# Patient Record
Sex: MALE | Race: WHITE
[De-identification: names, ages, dates, MRNs, and addresses within clinical notes are randomized per-mention and may not be internally consistent; named-entity substitution may affect disease eponyms.]

---

## 2020-07-15 ENCOUNTER — HOSPITAL ENCOUNTER (INPATIENT)
Dept: HOSPITAL 74 - YASAS | Age: 40
LOS: 6 days | Discharge: OTHER INSTITUTION | DRG: 773 | End: 2020-07-21
Attending: ALLERGY & IMMUNOLOGY | Admitting: ALLERGY & IMMUNOLOGY
Payer: COMMERCIAL

## 2020-07-15 VITALS — BODY MASS INDEX: 27.8 KG/M2

## 2020-07-15 DIAGNOSIS — F11.20: ICD-10-CM

## 2020-07-15 DIAGNOSIS — F13.230: ICD-10-CM

## 2020-07-15 DIAGNOSIS — Z79.2: ICD-10-CM

## 2020-07-15 DIAGNOSIS — Z87.2: ICD-10-CM

## 2020-07-15 DIAGNOSIS — F19.24: ICD-10-CM

## 2020-07-15 DIAGNOSIS — M25.569: ICD-10-CM

## 2020-07-15 DIAGNOSIS — F14.20: ICD-10-CM

## 2020-07-15 DIAGNOSIS — B18.2: ICD-10-CM

## 2020-07-15 DIAGNOSIS — F10.230: Primary | ICD-10-CM

## 2020-07-15 DIAGNOSIS — N30.20: ICD-10-CM

## 2020-07-15 DIAGNOSIS — M54.89: ICD-10-CM

## 2020-07-15 DIAGNOSIS — F17.210: ICD-10-CM

## 2020-07-15 DIAGNOSIS — Z90.5: ICD-10-CM

## 2020-07-15 PROCEDURE — U0003 INFECTIOUS AGENT DETECTION BY NUCLEIC ACID (DNA OR RNA); SEVERE ACUTE RESPIRATORY SYNDROME CORONAVIRUS 2 (SARS-COV-2) (CORONAVIRUS DISEASE [COVID-19]), AMPLIFIED PROBE TECHNIQUE, MAKING USE OF HIGH THROUGHPUT TECHNOLOGIES AS DESCRIBED BY CMS-2020-01-R: HCPCS

## 2020-07-15 PROCEDURE — HZ2ZZZZ DETOXIFICATION SERVICES FOR SUBSTANCE ABUSE TREATMENT: ICD-10-PCS | Performed by: ALLERGY & IMMUNOLOGY

## 2020-07-15 RX ADMIN — CLINDAMYCIN HYDROCHLORIDE SCH MG: 150 CAPSULE ORAL at 20:38

## 2020-07-15 RX ADMIN — Medication SCH APPLIC: at 21:44

## 2020-07-15 RX ADMIN — Medication SCH MG: at 22:12

## 2020-07-15 RX ADMIN — CLINDAMYCIN HYDROCHLORIDE SCH MG: 150 CAPSULE ORAL at 23:00

## 2020-07-15 NOTE — HP
CIWA Score


Nausea/Vomitin-Mild Nausea/No Vomiting


Muscle Tremors: 4-Moderate,w/Arms Extend


Anxiety: 4-Mod. Anxious/Guarded


Agitation: 2


Paroxysmal Sweats: 1-Minimal Palms Moist


Orientation: 0-Oriented


Tacttile Disturbances: 0-None


Auditory Disturbances: 0-None


Visual Disturbances: 0-None


Headache: 0-None Present


CIWA-Ar Total Score: 12





- Admission Criteria


[facility] Guidelines: Admission for Medically Managed Detox: 


Requires at least one of the followin. CIWA greater than 12


2. Seizures within the past 24 hours


3. Delirium tremens within the past 24 hours


4. Hallucinations within the past 24 hours


5. Acute intervention needed for co  occurring medical disorder


6. Acute intervention needed for co  occurring psychiatric disorder


7. Severe withdrawal that cannot be handled at a lower level of care (continued


    vomiting, continued diarrhea, abnormal vital signs) requiring intravenous


    medication and/or fluids


8. Pregnancy








Admission ROS [facility]





- [facility]


Allergies/Adverse Reactions: 


                                    Allergies











Allergy/AdvReac Type Severity Reaction Status Date / Time


 


No Known Allergies Allergy   Verified 07/15/20 19:23











History of Present Illness: 








Search Terms: lorraine dickerson, 1980


Search Date: 07/15/2020 18:36:24 PM





The Drug Utilization Report below displays all of the controlled substance 

prescriptions, if any, that your patient has filled in the last twelve months. 

The information displayed on this report is compiled from pharmacy submissions 

to the Department, and accurately reflects the information as submitted by the 

pharmacies.





This report was requested by: Nasima Gan | Reference #: [id number]





There are no results for the search terms that you entered.





39  y.o. male  requesting detox from  benzo  and alcohol  use  . 


reports alcohol  use  since  age 14  , current  daily  use 8  beers/day since 

2020  , reports  tremors  if  not drinking  ,denies  blackouts  and  

seizures . Prior to January  he was  drinking  1  pint  liquor  /day  since  5  

years  ago  , prior  to  which  he was  drinking  socially  .  latest  use  was 

yesterday . 


cocaine  - 1  -2  gr  /day  via  IV  in   [place]  ,  abscess  now  and  none  prior 

, denies  endocarditis  or seizures  .  First age of use  14 , IV  since age  22




heroin  since age  21  ,   OD x 2  most recently    , narcan  by EMS  ,  has

Narcan at  home  and  knows  how  to  use  it  . Longest  sobriety  [id number]  

while  incarcerated  for drug-related  charges  .  In  [facility]  intermittently  , 

most recently @ [facility]  , current  daily  dose  40  mg  due  to  many  missed . 


cannabis  - age  11  , currently  not  daily use .


tried  PCP  , MDMA  , crystal   metamphetamines  ,  psilocybine , crack  cocaine

 


benzo  -  since age  21  ,   had  rx  for a  while  ( 2  yrs )  , stopped   

,  restarted illicit  use  since  2019  ,  xanax  2  sticks /day each  2  

mg   up  to 5  /day  ,  reports  w/d seizure  most  recently   , never  on  

anti-epileptics   .  latest use  was yesterday .  


Pt  reports  he  had  right  arm swelling @ [facility]  site  , went o [facility]  , given rx for Clindamycin  after  one-time  IV dose  of Clindamycin .








PMHX  : Hep  C  dx  10 years ago  no tx  , states  he is  HIV negative  , denies

tx for STDs 


PSX : [place]  neph[place]  [place]    [place]/  rib frx  /  punctured  

kidney 


PSYCH :  denies  .  


SHX :  lives w/  mother  and  oldest son  age  23 .  Denies current  legal 

issues  . Unemployed  . 


Exam Limitations: No Limitations





- Review of Systems


Constitutional: Loss of Appetite


EENT: reports: Other (myopia)


Respiratory: reports: No Symptoms reported


Cardiac: reports: No Symptoms Reported


GI: reports: Poor Appetite


: reports: No Symptoms Reported


Musculoskeletal: reports: Joint Pain (knee  pain  , back  pain  -  bodyaches)


Integumentary: reports: See HPI, Other (r  arm abscess)


Neuro: reports: Tremors


Endocrine: reports: No Symptoms Reported


Hematology: reports: No Symptoms Reported


Psychiatric: reports: Orientated x3, Agitated, Anxious





Patient History





- Smoking Cessation


Smoking history: Current every day smoker


Have you smoked in the past 12 months: Yes


Hx Chewing Tobacco Use: No


Initiated information on smoking cessation: Yes


'Breaking Loose' booklet given: 07/15/20





- Substances abused


  ** Alcohol


Substance route: Oral


Frequency: Daily


Amount used: beer 8 to 10 cans.


Age of first use: 14


Date of last use: 20





  ** Cocaine


Substance route: Inhalation


Frequency: 1-2 times per week


Amount used: 1 to 2 grams


Age of first use: 14


Date of last use: 17





Admission Physical Exam [facility]





- Physical


General Appearance: Yes: Mild Distress, Anxious


HEENTM: Yes: EOMI, Hearing grossly Normal, Normocephalic, Normal Voice


Respiratory: Yes: Chest Non-Tender, Lungs Clear, Normal Breath Sounds, No 

Respiratory Distress, No Accessory Muscle Use


Neck: Yes: No masses,lesions,Nodules, Trachea in good position


Cardiology: Yes: Regular Rhythm, Regular Rate, S1, S2


Abdominal: Yes: Non Tender, Soft


Back: Yes: Normal Inspection


Musculoskeletal: Yes: Gait Steady


Extremities: Yes: Normal Inspection, Normal Range of Motion, Non-Tender, 

Tremors, Swelling (R  arm  antecubital)


Neurological: Yes: Fully Oriented, Alert, Motor Strength 5/5, Normal Mood/Affect


Integumentary: Yes: Warm, Track Marks (eleazar UE)





- Diagnostic


(1) Opioid dependence on agonist therapy


Current Visit: Yes   Status: Chronic   





(2) Sedative, hypnotic or anxiolytic abuse


Current Visit: Yes   Status: Chronic   





(3) Alcohol use disorder


Current Visit: Yes   Status: Chronic   





Inpatient Rehab Admission





- Rehab Decision to Admit


Inpatient rehab admission?: No

## 2020-07-16 LAB
ALBUMIN SERPL-MCNC: 3.4 G/DL (ref 3.4–5)
ALP SERPL-CCNC: 60 U/L (ref 45–117)
ALT SERPL-CCNC: 40 U/L (ref 13–61)
ANION GAP SERPL CALC-SCNC: 4 MMOL/L (ref 8–16)
AST SERPL-CCNC: 29 U/L (ref 15–37)
BILIRUB SERPL-MCNC: 0.6 MG/DL (ref 0.2–1)
BUN SERPL-MCNC: 12.9 MG/DL (ref 7–18)
CALCIUM SERPL-MCNC: 9.3 MG/DL (ref 8.5–10.1)
CHLORIDE SERPL-SCNC: 104 MMOL/L (ref 98–107)
CO2 SERPL-SCNC: 30 MMOL/L (ref 21–32)
CREAT SERPL-MCNC: 1.1 MG/DL (ref 0.55–1.3)
DEPRECATED RDW RBC AUTO: 12.7 % (ref 11.9–15.9)
GLUCOSE SERPL-MCNC: 90 MG/DL (ref 74–106)
HCT VFR BLD CALC: 38.4 % (ref 35.4–49)
HGB BLD-MCNC: 12.9 GM/DL (ref 11.7–16.9)
MCH RBC QN AUTO: 29.8 PG (ref 25.7–33.7)
MCHC RBC AUTO-ENTMCNC: 33.5 G/DL (ref 32–35.9)
MCV RBC: 88.8 FL (ref 80–96)
PLATELET # BLD AUTO: 200 K/MM3 (ref 134–434)
PMV BLD: 8.8 FL (ref 7.5–11.1)
POTASSIUM SERPLBLD-SCNC: 4.2 MMOL/L (ref 3.5–5.1)
PROT SERPL-MCNC: 8.2 G/DL (ref 6.4–8.2)
RBC # BLD AUTO: 4.32 M/MM3 (ref 4–5.6)
SODIUM SERPL-SCNC: 139 MMOL/L (ref 136–145)
WBC # BLD AUTO: 7 K/MM3 (ref 4–10)

## 2020-07-16 RX ADMIN — NICOTINE POLACRILEX PRN MG: 2 GUM, CHEWING ORAL at 14:44

## 2020-07-16 RX ADMIN — CLINDAMYCIN HYDROCHLORIDE SCH MG: 150 CAPSULE ORAL at 17:40

## 2020-07-16 RX ADMIN — CLINDAMYCIN HYDROCHLORIDE SCH MG: 150 CAPSULE ORAL at 23:07

## 2020-07-16 RX ADMIN — Medication SCH MG: at 22:06

## 2020-07-16 RX ADMIN — CLINDAMYCIN HYDROCHLORIDE SCH MG: 150 CAPSULE ORAL at 08:06

## 2020-07-16 RX ADMIN — Medication SCH APPLIC: at 22:13

## 2020-07-16 RX ADMIN — NICOTINE SCH: 7 PATCH TRANSDERMAL at 09:33

## 2020-07-16 RX ADMIN — CLINDAMYCIN HYDROCHLORIDE SCH MG: 150 CAPSULE ORAL at 13:35

## 2020-07-16 RX ADMIN — Medication SCH APPLIC: at 12:29

## 2020-07-16 RX ADMIN — Medication SCH: at 09:33

## 2020-07-16 NOTE — EKG
Test Reason : 

Blood Pressure : ***/*** mmHG

Vent. Rate : 062 BPM     Atrial Rate : 062 BPM

   P-R Int : 136 ms          QRS Dur : 086 ms

    QT Int : 428 ms       P-R-T Axes : 045 014 028 degrees

   QTc Int : 434 ms

 

NORMAL SINUS RHYTHM WITH SINUS ARRHYTHMIA

NORMAL ECG

NO PREVIOUS ECGS AVAILABLE

Confirmed by KEANU SHABAZZ MD ([id number]) on 7/16/2020 11:59:44 AM

 

Referred By:             Confirmed By:KEANU SHABAZZ MD

## 2020-07-16 NOTE — PN
[facility] CIWA





- CIWA Score


Nausea/Vomitin-Mild Nausea/No Vomiting


Muscle Tremors: 3


Anxiety: 3


Agitation: 3


Paroxysmal Sweats: 1-Minimal Palms Moist


Orientation: 0-Oriented


Tacttile Disturbances: 0-None


Auditory Disturbances: 0-None


Visual Disturbances: 0-None


Headache: 0-None Present


CIWA-Ar Total Score: 11





[facility] Progress Note (SOAP)


Subjective: 





39 years old male admitted on 07/15/20 for alcohol and benzo withdrawal sx 

management


treating with librium detox regiment


taking methadone 40mg po daily last dose 20


cows = 8


methadone 40mg resumed


Objective: 





20 12:54


                               Vital Signs - 24 hr











  07/15/20 07/15/20 07/16/20





  19:34 20:32 06:18


 


Temperature 97.8 F 97.7 F 97.1 F L


 


Pulse Rate 68 72 54 L


 


Respiratory 18 18 18





Rate   


 


Blood Pressure 133/89 128/82 101/58 L


 


O2 Sat by Pulse  100 100





Oximetry (%)   














  20





  09:03


 


Temperature 97.1 F L


 


Pulse Rate 67


 


Respiratory 18





Rate 


 


Blood Pressure 114/75


 


O2 Sat by Pulse 





Oximetry (%) 








                                Laboratory Tests











  20





  07:50 07:50 07:50


 


WBC   7.0 


 


RBC   4.32 


 


Hgb   12.9 


 


Hct   38.4 


 


MCV   88.8 


 


MCH   29.8 


 


MCHC   33.5 


 


RDW   12.7 


 


Plt Count   200 


 


MPV   8.8 


 


Sodium    139


 


Potassium    4.2


 


Chloride    104


 


Carbon Dioxide    30


 


Anion Gap    4 L


 


BUN    12.9


 


Creatinine    1.1


 


Est GFR (CKD-EPI)AfAm    96.13


 


Est GFR (CKD-EPI)NonAf    82.94


 


Random Glucose    90


 


Calcium    9.3


 


Total Bilirubin    0.6


 


AST    29


 


ALT    40


 


Alkaline Phosphatase    60


 


Total Protein    8.2


 


Albumin    3.4


 


Syphilis Serology  Non-reactive  








lab noted


20 12:55


covid pending


Assessment: 





20 12:55


alcohol and benzo withdrawal 


Plan: 





librium regiment





COWS (PN)





- Opiate Withdrawal


Resting Pulse: 0= ME 80 or Below


Sweatin= No chills or Flushing


Restless Observation: 0= Sits Still


Pupil Size: 1= Pupils >than Normal


Bone or Joint Aches: 1= Mild Discomfort


Runny Nose/ Eye Tearin= None


GI Upset > 30mins: 0= None


Tremor Observation of Outstretched Hands: 2= Slight Tremor Visible


Yawning Observation: 0= None


Anxiety or Irritability: 1=Feels Anxious/Irritable


Goose Flesh Skin: 3=Piloerection


COWS Score: 8

## 2020-07-17 RX ADMIN — METHADONE HYDROCHLORIDE SCH MG: 40 TABLET ORAL at 05:29

## 2020-07-17 RX ADMIN — CLINDAMYCIN HYDROCHLORIDE SCH MG: 150 CAPSULE ORAL at 17:24

## 2020-07-17 RX ADMIN — NICOTINE SCH MG: 7 PATCH TRANSDERMAL at 10:04

## 2020-07-17 RX ADMIN — CLINDAMYCIN HYDROCHLORIDE SCH MG: 150 CAPSULE ORAL at 23:04

## 2020-07-17 RX ADMIN — Medication SCH: at 22:06

## 2020-07-17 RX ADMIN — Medication SCH MG: at 22:04

## 2020-07-17 RX ADMIN — Medication SCH: at 10:04

## 2020-07-17 RX ADMIN — Medication SCH APPLIC: at 10:04

## 2020-07-17 RX ADMIN — CLINDAMYCIN HYDROCHLORIDE SCH MG: 150 CAPSULE ORAL at 05:30

## 2020-07-17 RX ADMIN — CLINDAMYCIN HYDROCHLORIDE SCH MG: 150 CAPSULE ORAL at 13:14

## 2020-07-17 RX ADMIN — Medication SCH: at 22:04

## 2020-07-17 NOTE — PN
S CIWA





- CIWA Score


Nausea/Vomitin-No Nausea/No Vomiting


Muscle Tremors: None


Anxiety: 1-Mildly Anxious


Agitation: 0-Normal Activity


Paroxysmal Sweats: 1-Minimal Palms Moist


Orientation: 0-Oriented


Tacttile Disturbances: 0-None


Auditory Disturbances: 0-None


Visual Disturbances: 0-None


Headache: 0-None Present


CIWA-Ar Total Score: 2





[facility] Progress Note (SOAP)


Subjective: 





40 y/o admitted on 07/15/20 for alcohol and benzo withdrawal sx management


treating with librium detox regimen


taking methadone 40mg po daily last dose 20


methadone 40mg resumed


Objective: 





20 12:31





PE


AAOx3, NAD


AT/NC


RRR, No MRG S1S2


CTAB


NDNT


No CCE


                             Laboratory Last Values











WBC  7.0 K/mm3 (4.0-10.0)   20  07:50    


 


RBC  4.32 M/mm3 (4.00-5.60)   20  07:50    


 


Hgb  12.9 GM/dL (11.7-16.9)   20  07:50    


 


Hct  38.4 % (35.4-49)   20  07:50    


 


MCV  88.8 fl (80-96)   20  07:50    


 


MCH  29.8 pg (25.7-33.7)   20  07:50    


 


MCHC  33.5 g/dl (32.0-35.9)   20  07:50    


 


RDW  12.7 % (11.9-15.9)   20  07:50    


 


Plt Count  200 K/MM3 (134-434)   20  07:50    


 


MPV  8.8 fl (7.5-11.1)   20  07:50    


 


Sodium  139 mmol/L (136-145)   20  07:50    


 


Potassium  4.2 mmol/L (3.5-5.1)   20  07:50    


 


Chloride  104 mmol/L ()   20  07:50    


 


Carbon Dioxide  30 mmol/L (21-32)   20  07:50    


 


Anion Gap  4 MMOL/L (8-16)  L  20  07:50    


 


BUN  12.9 mg/dL (7-18)   20  07:50    


 


Creatinine  1.1 mg/dL (0.55-1.3)   20  07:50    


 


Est GFR (CKD-EPI)AfAm  96.13   20  07:50    


 


Est GFR (CKD-EPI)NonAf  82.94   20  07:50    


 


Random Glucose  90 mg/dL ()   20  07:50    


 


Calcium  9.3 mg/dL (8.5-10.1)   20  07:50    


 


Total Bilirubin  0.6 mg/dL (0.2-1)   20  07:50    


 


AST  29 U/L (15-37)   20  07:50    


 


ALT  40 U/L (13-61)   20  07:50    


 


Alkaline Phosphatase  60 U/L ()   20  07:50    


 


Total Protein  8.2 g/dl (6.4-8.2)   20  07:50    


 


Albumin  3.4 g/dl (3.4-5.0)   20  07:50    


 


Syphilis Serology  Non-reactive  (NONREACTIVE)   20  07:50    











Assessment: 





20 12:32


1-Alcohol withdrawal.


2-Opiate dependence


3-Cocaine dependence


4-Benzodiazapine dependence


Plan: 





C/w Librium Protocol


C/w outpatient Methadone dose

## 2020-07-18 RX ADMIN — Medication SCH: at 22:10

## 2020-07-18 RX ADMIN — Medication SCH MG: at 22:08

## 2020-07-18 RX ADMIN — NICOTINE POLACRILEX PRN MG: 2 GUM, CHEWING ORAL at 10:13

## 2020-07-18 RX ADMIN — Medication SCH APPLIC: at 22:10

## 2020-07-18 RX ADMIN — NICOTINE POLACRILEX PRN MG: 2 GUM, CHEWING ORAL at 20:05

## 2020-07-18 RX ADMIN — CLINDAMYCIN HYDROCHLORIDE SCH MG: 150 CAPSULE ORAL at 12:36

## 2020-07-18 RX ADMIN — NICOTINE SCH MG: 7 PATCH TRANSDERMAL at 10:12

## 2020-07-18 RX ADMIN — METHADONE HYDROCHLORIDE SCH MG: 40 TABLET ORAL at 05:43

## 2020-07-18 RX ADMIN — CLINDAMYCIN HYDROCHLORIDE SCH MG: 150 CAPSULE ORAL at 05:43

## 2020-07-18 RX ADMIN — Medication SCH: at 10:12

## 2020-07-18 RX ADMIN — CLINDAMYCIN HYDROCHLORIDE SCH MG: 150 CAPSULE ORAL at 17:31

## 2020-07-18 RX ADMIN — HYDROXYZINE PAMOATE PRN MG: 25 CAPSULE ORAL at 19:30

## 2020-07-18 RX ADMIN — Medication SCH: at 10:11

## 2020-07-18 NOTE — PN
S CIWA





- CIWA Score


Nausea/Vomitin-No Nausea/No Vomiting


Muscle Tremors: None


Anxiety: 3


Agitation: 0-Normal Activity


Paroxysmal Sweats: 3


Orientation: 0-Oriented


Tacttile Disturbances: 0-None


Auditory Disturbances: 0-None


Visual Disturbances: 0-None


Headache: 0-None Present


CIWA-Ar Total Score: 6





[facility] Progress Note (SOAP)


Subjective: 





c/o sweats and anxiety.


Objective: 





20 12:27


                                   Vital Signs











  20





  06:00 08:37


 


Temperature 97.3 F L 97.5 F L


 


Pulse Rate 51 L 69


 


Respiratory 18 18





Rate  


 


Blood Pressure 101/66 106/84


 


O2 Sat by Pulse 100 





Oximetry (%)  








                             Laboratory Last Values











WBC  7.0 K/mm3 (4.0-10.0)   20  07:50    


 


RBC  4.32 M/mm3 (4.00-5.60)   20  07:50    


 


Hgb  12.9 GM/dL (11.7-16.9)   20  07:50    


 


Hct  38.4 % (35.4-49)   20  07:50    


 


MCV  88.8 fl (80-96)   20  07:50    


 


MCH  29.8 pg (25.7-33.7)   20  07:50    


 


MCHC  33.5 g/dl (32.0-35.9)   20  07:50    


 


RDW  12.7 % (11.9-15.9)   20  07:50    


 


Plt Count  200 K/MM3 (134-434)   20  07:50    


 


MPV  8.8 fl (7.5-11.1)   20  07:50    


 


Sodium  139 mmol/L (136-145)   20  07:50    


 


Potassium  4.2 mmol/L (3.5-5.1)   20  07:50    


 


Chloride  104 mmol/L ()   20  07:50    


 


Carbon Dioxide  30 mmol/L (21-32)   20  07:50    


 


Anion Gap  4 MMOL/L (8-16)  L  20  07:50    


 


BUN  12.9 mg/dL (7-18)   20  07:50    


 


Creatinine  1.1 mg/dL (0.55-1.3)   20  07:50    


 


Est GFR (CKD-EPI)AfAm  96.13   20  07:50    


 


Est GFR (CKD-EPI)NonAf  82.94   20  07:50    


 


Random Glucose  90 mg/dL ()   20  07:50    


 


Calcium  9.3 mg/dL (8.5-10.1)   20  07:50    


 


Total Bilirubin  0.6 mg/dL (0.2-1)   20  07:50    


 


AST  29 U/L (15-37)   20  07:50    


 


ALT  40 U/L (13-61)   20  07:50    


 


Alkaline Phosphatase  60 U/L ()   20  07:50    


 


Total Protein  8.2 g/dl (6.4-8.2)   20  07:50    


 


Albumin  3.4 g/dl (3.4-5.0)   20  07:50    


 


Syphilis Serology  Non-reactive  (NONREACTIVE)   20  07:50    


 


COVID-19 ([facility])  Not detected  (Not Detected)   07/15/20  23:30    








Labs noted.


Assessment: 





20 12:28


AOX3, in no acute respiratory distress.


Full ROM, ambulating in the unit.


Withdrawal symptoms


Plan: 





continue detox.

## 2020-07-19 RX ADMIN — Medication SCH APPLIC: at 21:42

## 2020-07-19 RX ADMIN — Medication SCH MG: at 21:41

## 2020-07-19 RX ADMIN — CLINDAMYCIN HYDROCHLORIDE SCH MG: 150 CAPSULE ORAL at 00:10

## 2020-07-19 RX ADMIN — CLINDAMYCIN HYDROCHLORIDE SCH MG: 150 CAPSULE ORAL at 17:21

## 2020-07-19 RX ADMIN — METHADONE HYDROCHLORIDE SCH MG: 40 TABLET ORAL at 05:35

## 2020-07-19 RX ADMIN — Medication SCH APPLIC: at 09:43

## 2020-07-19 RX ADMIN — HYDROXYZINE PAMOATE PRN MG: 25 CAPSULE ORAL at 22:10

## 2020-07-19 RX ADMIN — Medication SCH: at 09:42

## 2020-07-19 RX ADMIN — HYDROXYZINE PAMOATE PRN MG: 25 CAPSULE ORAL at 09:39

## 2020-07-19 RX ADMIN — NICOTINE SCH MG: 7 PATCH TRANSDERMAL at 09:39

## 2020-07-19 RX ADMIN — CLINDAMYCIN HYDROCHLORIDE SCH MG: 150 CAPSULE ORAL at 13:25

## 2020-07-19 RX ADMIN — CLINDAMYCIN HYDROCHLORIDE SCH MG: 150 CAPSULE ORAL at 05:36

## 2020-07-19 NOTE — PN
S CIWA





- CIWA Score


Nausea/Vomitin-No Nausea/No Vomiting


Muscle Tremors: 1-None Visible, but Felt


Anxiety: 1-Mildly Anxious


Agitation: 0-Normal Activity


Paroxysmal Sweats: No Perspiration


Orientation: 0-Oriented


Tacttile Disturbances: 1-Very Mild Itch/Numbness


Auditory Disturbances: 0-None


Visual Disturbances: 0-None


Headache: 0-None Present


CIWA-Ar Total Score: 3





[facility] Progress Note (SOAP)


Subjective: 





39 years old male admitted on 07/15/20 for alcohol withdrawal sx management 

treating with librium detox regiment


received methadone 40mg po today 


feeling better ate breakfast in room social with peers in day room


discussing aftercare with staff mr dickerson prefers [facility] long term alcohol 

recovery facility 


mr dickerson will follow up with methadone program for medical and mental issues


Objective: 





20 09:49


                               Vital Signs - 24 hr











  20





  12:28 16:46 17:29


 


Temperature 96.6 F L 97.3 F L 


 


Pulse Rate 66 61 65


 


Respiratory 18 18 18





Rate   


 


Blood Pressure 102/63 88/59 L 95/65


 


O2 Sat by Pulse 99  





Oximetry (%)   














  20





  20:33 05:33 09:02


 


Temperature 97.1 F L 97.5 F L 97.5 F L


 


Pulse Rate 67 50 L 71


 


Respiratory 18 16 18





Rate   


 


Blood Pressure 112/71 102/69 104/66


 


O2 Sat by Pulse 99 99 





Oximetry (%)   








                                Laboratory Tests











  07/15/20 07/16/20 07/16/20





  23:30 07:50 07:50


 


WBC    7.0


 


RBC    4.32


 


Hgb    12.9


 


Hct    38.4


 


MCV    88.8


 


MCH    29.8


 


MCHC    33.5


 


RDW    12.7


 


Plt Count    200


 


MPV    8.8


 


Sodium   


 


Potassium   


 


Chloride   


 


Carbon Dioxide   


 


Anion Gap   


 


BUN   


 


Creatinine   


 


Est GFR (CKD-EPI)AfAm   


 


Est GFR (CKD-EPI)NonAf   


 


Random Glucose   


 


Calcium   


 


Total Bilirubin   


 


AST   


 


ALT   


 


Alkaline Phosphatase   


 


Total Protein   


 


Albumin   


 


Syphilis Serology   Non-reactive 


 


COVID-19 ([facility])  Not detected  














  20





  07:50


 


WBC 


 


RBC 


 


Hgb 


 


Hct 


 


MCV 


 


MCH 


 


MCHC 


 


RDW 


 


Plt Count 


 


MPV 


 


Sodium  139


 


Potassium  4.2


 


Chloride  104


 


Carbon Dioxide  30


 


Anion Gap  4 L


 


BUN  12.9


 


Creatinine  1.1


 


Est GFR (CKD-EPI)AfAm  96.13


 


Est GFR (CKD-EPI)NonAf  82.94


 


Random Glucose  90


 


Calcium  9.3


 


Total Bilirubin  0.6


 


AST  29


 


ALT  40


 


Alkaline Phosphatase  60


 


Total Protein  8.2


 


Albumin  3.4


 


Syphilis Serology 


 


COVID-19 ([facility]) 








lab noted


Assessment: 





20 09:49


alcohol withdrawal 


methadone program 


Plan: 





librium regiment


methadone 40 mg po daily

## 2020-07-20 RX ADMIN — CLINDAMYCIN HYDROCHLORIDE SCH MG: 150 CAPSULE ORAL at 12:09

## 2020-07-20 RX ADMIN — NICOTINE POLACRILEX PRN MG: 2 GUM, CHEWING ORAL at 10:30

## 2020-07-20 RX ADMIN — Medication SCH MG: at 22:11

## 2020-07-20 RX ADMIN — NICOTINE SCH MG: 7 PATCH TRANSDERMAL at 10:11

## 2020-07-20 RX ADMIN — CLINDAMYCIN HYDROCHLORIDE SCH MG: 150 CAPSULE ORAL at 17:14

## 2020-07-20 RX ADMIN — HYDROXYZINE PAMOATE PRN MG: 25 CAPSULE ORAL at 10:12

## 2020-07-20 RX ADMIN — METHADONE HYDROCHLORIDE SCH MG: 40 TABLET ORAL at 05:45

## 2020-07-20 RX ADMIN — CLINDAMYCIN HYDROCHLORIDE SCH MG: 150 CAPSULE ORAL at 05:44

## 2020-07-20 RX ADMIN — HYDROXYZINE PAMOATE PRN MG: 25 CAPSULE ORAL at 17:19

## 2020-07-20 RX ADMIN — CLINDAMYCIN HYDROCHLORIDE SCH MG: 150 CAPSULE ORAL at 23:02

## 2020-07-20 RX ADMIN — Medication SCH: at 22:11

## 2020-07-20 RX ADMIN — Medication SCH APPLIC: at 10:11

## 2020-07-20 RX ADMIN — CLINDAMYCIN HYDROCHLORIDE SCH: 150 CAPSULE ORAL at 01:13

## 2020-07-20 RX ADMIN — Medication SCH: at 22:13

## 2020-07-20 RX ADMIN — Medication SCH: at 10:11

## 2020-07-20 NOTE — PN
[facility] CIWA





- CIWA Score


Nausea/Vomitin-No Nausea/No Vomiting


Muscle Tremors: None


Anxiety: 3


Agitation: 0-Normal Activity


Paroxysmal Sweats: No Perspiration


Orientation: 0-Oriented


Tacttile Disturbances: 0-None


Auditory Disturbances: 0-None


Visual Disturbances: 0-None


Headache: 0-None Present


CIWA-Ar Total Score: 3





[facility] Progress Note (SOAP)


Subjective: 





39 years old male admitted on 07/15/20 for alcohol withdrawal sx management 

treating with labium detox regiment


Mr Cunningham presents alcohol withdrawal sx required labium management


one dose of labium now less tremor mild anxiety 





right inner elbow IV drug entry excavation healed wound no wound care 

requirement clean with soap and water avoid irritation continue cleocin 


Objective: 





20 11:32


                               Vital Signs - 24 hr











  20





  12:29 16:50 20:28


 


Temperature 97.8 F 97.5 F L 97.5 F L


 


Pulse Rate 67 66 66


 


Respiratory 20 18 18





Rate   


 


Blood Pressure 101/66 92/64 103/73


 


O2 Sat by Pulse 98  98





Oximetry (%)   














  20





  06:46 08:47


 


Temperature 96.9 F L 96.9 F L


 


Pulse Rate 52 L 97 H


 


Respiratory 18 18





Rate  


 


Blood Pressure 98/60 116/75


 


O2 Sat by Pulse 97 





Oximetry (%)  








                                Laboratory Tests











  07/15/20 07/16/20 07/16/20





  23:30 07:50 07:50


 


WBC    7.0


 


RBC    4.32


 


Hgb    12.9


 


Hct    38.4


 


MCV    88.8


 


MCH    29.8


 


MCHC    33.5


 


RDW    12.7


 


Plt Count    200


 


MPV    8.8


 


Sodium   


 


Potassium   


 


Chloride   


 


Carbon Dioxide   


 


Anion Gap   


 


BUN   


 


Creatinine   


 


Est GFR (CKD-EPI)AfAm   


 


Est GFR (CKD-EPI)NonAf   


 


Random Glucose   


 


Calcium   


 


Total Bilirubin   


 


AST   


 


ALT   


 


Alkaline Phosphatase   


 


Total Protein   


 


Albumin   


 


Syphilis Serology   Non-reactive 


 


COVID-19 ([facility])  Not detected  














  20





  07:50


 


WBC 


 


RBC 


 


Hgb 


 


Hct 


 


MCV 


 


MCH 


 


MCHC 


 


RDW 


 


Plt Count 


 


MPV 


 


Sodium  139


 


Potassium  4.2


 


Chloride  104


 


Carbon Dioxide  30


 


Anion Gap  4 L


 


BUN  12.9


 


Creatinine  1.1


 


Est GFR (CKD-EPI)AfAm  96.13


 


Est GFR (CKD-EPI)NonAf  82.94


 


Random Glucose  90


 


Calcium  9.3


 


Total Bilirubin  0.6


 


AST  29


 


ALT  40


 


Alkaline Phosphatase  60


 


Total Protein  8.2


 


Albumin  3.4


 


Syphilis Serology 


 


COVID-19 ([facility]) 








lab noted


Assessment: 





20 11:34


alcohol withdrawal 


Plan: 





librium regiment

## 2020-07-20 NOTE — DS
[facility] Detox Discharge Summary


Admission Date: 


07/15/20





Discharge Date: 20





- History


Present History: Alcohol Dependence


Additional Comments: 





39 years old male admitted on 07/15/20 for alcohol withdrawal sx management 

treated with labium detox regiment


Mr Cunningham is chronically intravenous drug user who has completed the labium 

regiment and is tolerated well


right inner elbow substance excavation 3-4 weeks, Lanced "weeks" ago in ER 

discharged with Cleocin 600mg po q6h


right elbow IV entry wound healed no would care required clean with soap and 

water avoid irritation to the area continue cleocin po 


alert oriented x 3 speech clear coherently ambulating with steady gaits








- Physical Exam Results


Vital Signs: 


                                   Vital Signs











Temperature  96.9 F L  20 06:46


 


Pulse Rate  52 L  20 06:46


 


Respiratory Rate  18   20 06:46


 


Blood Pressure  98/60   20 06:46


 


O2 Sat by Pulse Oximetry (%)  97   20 06:46














- Treatment


Hospital Course: Detox Protocol Followed, Detoxed Safely, Responded well, 

Discharged Condition Good, Rehab Referral Accepted





- Medication


Discharge Medications: 


Ambulatory Orders





Clindamycin [Cleocin -] 600 mg PO Q6H 07/15/20 


Clindamycin [Cleocin -] 600 mg PO Q6HPO #14 capsule 20 











- AMA


Did Patient Leave Against Medical Advice: No





CIWA Score





- CIWA Score


Nausea/Vomitin-No Nausea/No Vomiting


Muscle Tremors: 1-None Visible, but Felt


Anxiety: 0-No Anxiety, at Ease


Agitation: 0-Normal Activity


Paroxysmal Sweats: No Perspiration


Orientation: 0-Oriented


Tacttile Disturbances: 0-None


Auditory Disturbances: 0-None


Visual Disturbances: 0-None


Headache: 0-None Present


CIWA-Ar Total Score: 1

## 2020-07-21 ENCOUNTER — HOSPITAL ENCOUNTER (INPATIENT)
Dept: HOSPITAL 74 - YASAS | Age: 40
LOS: 15 days | Discharge: HOME | DRG: 772 | End: 2020-08-05
Attending: ALLERGY & IMMUNOLOGY | Admitting: ALLERGY & IMMUNOLOGY
Payer: COMMERCIAL

## 2020-07-21 VITALS — DIASTOLIC BLOOD PRESSURE: 70 MMHG | HEART RATE: 81 BPM | SYSTOLIC BLOOD PRESSURE: 104 MMHG | TEMPERATURE: 97.2 F

## 2020-07-21 DIAGNOSIS — F13.10: ICD-10-CM

## 2020-07-21 DIAGNOSIS — F11.20: ICD-10-CM

## 2020-07-21 DIAGNOSIS — F10.20: Primary | ICD-10-CM

## 2020-07-21 PROCEDURE — HZ42ZZZ GROUP COUNSELING FOR SUBSTANCE ABUSE TREATMENT, COGNITIVE-BEHAVIORAL: ICD-10-PCS | Performed by: ALLERGY & IMMUNOLOGY

## 2020-07-21 RX ADMIN — CLINDAMYCIN HYDROCHLORIDE SCH MG: 150 CAPSULE ORAL at 11:17

## 2020-07-21 RX ADMIN — Medication SCH: at 12:37

## 2020-07-21 RX ADMIN — METHADONE HYDROCHLORIDE SCH MG: 40 TABLET ORAL at 05:26

## 2020-07-21 RX ADMIN — CLINDAMYCIN HYDROCHLORIDE SCH: 150 CAPSULE ORAL at 12:37

## 2020-07-21 RX ADMIN — NICOTINE POLACRILEX PRN MG: 2 GUM, CHEWING ORAL at 09:14

## 2020-07-21 RX ADMIN — NICOTINE SCH MG: 7 PATCH TRANSDERMAL at 10:20

## 2020-07-21 RX ADMIN — HYDROXYZINE PAMOATE PRN MG: 25 CAPSULE ORAL at 21:31

## 2020-07-21 RX ADMIN — Medication SCH TAB: at 10:20

## 2020-07-21 RX ADMIN — Medication SCH MG: at 21:31

## 2020-07-21 RX ADMIN — CLINDAMYCIN HYDROCHLORIDE SCH MG: 150 CAPSULE ORAL at 23:20

## 2020-07-21 RX ADMIN — HYDROXYZINE PAMOATE PRN MG: 25 CAPSULE ORAL at 10:20

## 2020-07-21 RX ADMIN — Medication SCH APPLIC: at 10:20

## 2020-07-21 RX ADMIN — NICOTINE SCH: 7 PATCH TRANSDERMAL at 12:37

## 2020-07-21 RX ADMIN — NICOTINE POLACRILEX PRN MG: 2 GUM, CHEWING ORAL at 19:36

## 2020-07-21 RX ADMIN — CLINDAMYCIN HYDROCHLORIDE SCH MG: 150 CAPSULE ORAL at 05:27

## 2020-07-21 RX ADMIN — CLINDAMYCIN HYDROCHLORIDE SCH MG: 150 CAPSULE ORAL at 17:59

## 2020-07-21 NOTE — HP
[facility] MD Rehab Assess/Revision





- Admission History


Admitted to Rehab from: [facility]


Date of Admission to Rehab: 07/21/20





- Findings


Detox History & Physical reviewed: Yes


Concur with findings: Yes


Comments/Additional Findings: transferred from detox to rehab admission as per 

protocol





Inpatient Rehab Admission





- Rehab Decision to Admit


Inpatient rehab admission?: Yes





- Initial Determination


Are CD services needed?: Yes


Free of communicable disease: Yes


Not in need of hospitalization: Yes





- Rehab Admission Criteria


Previous failed treatment: Yes


Poor recovery environment: Yes


Comorbidities: Yes


Lacks judgement: Yes


Patient is meeting Inpatient Rehab admission criteria:: Yes

## 2020-07-21 NOTE — DS
[facility] Detox Discharge Summary


Admission Date: 


07/15/20





Discharge Date: 20





- History


Present History: Alcohol Dependence


Additional Comments: 





39 years old male admitted on 07/15/20 for alcohol withdrawal sx management


treated with labium detox regiment


feeling better today less anxiousness with logic conception to go to [facility] 

for alcohol recovery


right inner elbow chronic phlebitis treated at "ER" drained and cleaned


current oral antibiotic cleocin q6h 


 mr dickerson tolerates cleocin well


mr dickerson exhibits less alcohol and benzo withdrawal sx today


transferred to [facility] with confident of sobriety 





alert oriented x 3 speech clearly coherently ambulating with steady gaits 


respiratory clear lung sounds bilaterally on auscultation


abdomen soft no rebound tenderness


extremities full range of motion 


Pertinent Past History: 





time for discharge 35 minutes





treatment team met with mr dickerson whom can be safely discharged today related

to emotional and physical readiness





received methadone 40mg po daily





- Physical Exam Results


Vital Signs: 


                                   Vital Signs











Temperature  98.2 F   20 06:22


 


Pulse Rate  61   20 06:22


 


Respiratory Rate  18   20 06:22


 


Blood Pressure  99/57 L  20 06:22


 


O2 Sat by Pulse Oximetry (%)  97   20 06:22











Pertinent Admission Physical Exam Findings: 





alcohol withdrawal 


                               Vital Signs - 24 hr











  20





  12:24 16:32 20:13


 


Temperature 96.9 F L 97.3 F L 97.1 F L


 


Pulse Rate 80 75 70


 


Respiratory 18 18 18





Rate   


 


Blood Pressure 106/74 94/66 94/66


 


O2 Sat by Pulse 99  97





Oximetry (%)   














  20





  06:22


 


Temperature 98.2 F


 


Pulse Rate 61


 


Respiratory 18





Rate 


 


Blood Pressure 99/57 L


 


O2 Sat by Pulse 97





Oximetry (%) 








                                Laboratory Tests











  07/15/20 07/16/20 07/16/20





  23:30 07:50 07:50


 


WBC    7.0


 


RBC    4.32


 


Hgb    12.9


 


Hct    38.4


 


MCV    88.8


 


MCH    29.8


 


MCHC    33.5


 


RDW    12.7


 


Plt Count    200


 


MPV    8.8


 


Sodium   


 


Potassium   


 


Chloride   


 


Carbon Dioxide   


 


Anion Gap   


 


BUN   


 


Creatinine   


 


Est GFR (CKD-EPI)AfAm   


 


Est GFR (CKD-EPI)NonAf   


 


Random Glucose   


 


Calcium   


 


Total Bilirubin   


 


AST   


 


ALT   


 


Alkaline Phosphatase   


 


Total Protein   


 


Albumin   


 


Syphilis Serology   Non-reactive 


 


COVID-19 ([facility])  Not detected  














  20





  07:50


 


WBC 


 


RBC 


 


Hgb 


 


Hct 


 


MCV 


 


MCH 


 


MCHC 


 


RDW 


 


Plt Count 


 


MPV 


 


Sodium  139


 


Potassium  4.2


 


Chloride  104


 


Carbon Dioxide  30


 


Anion Gap  4 L


 


BUN  12.9


 


Creatinine  1.1


 


Est GFR (CKD-EPI)AfAm  96.13


 


Est GFR (CKD-EPI)NonAf  82.94


 


Random Glucose  90


 


Calcium  9.3


 


Total Bilirubin  0.6


 


AST  29


 


ALT  40


 


Alkaline Phosphatase  60


 


Total Protein  8.2


 


Albumin  3.4


 


Syphilis Serology 


 


COVID-19 ([facility]) 








lab noted





- Treatment


Hospital Course: Detox Protocol Followed, Detoxed Safely, Responded well, 

Discharged Condition Good, Rehab Referral Accepted


Patient [facility] Accept[facility] Referral [facility] 





- Medication


Discharge Medications: 


Ambulatory Orders





Clindamycin [Cleocin -] 600 mg PO Q6H 07/15/20 


Clindamycin [Cleocin -] 600 mg PO Q6HPO #14 capsule 20 











- Diagnosis


(1) Chronic infective cystitis


Current Visit: Yes   Status: Chronic   





(2) Alcohol use disorder


Current Visit: Yes   Status: Acute   





(3) Substance induced mood disorder


Current Visit: Yes   Status: Suspected   





(4) IVDU (intravenous drug user)


Current Visit: Yes   Status: Chronic   





- AMA


Did Patient Leave Against Medical Advice: No





CIWA Score





- CIWA Score


Nausea/Vomitin-No Nausea/No Vomiting


Muscle Tremors: None


Anxiety: 2


Agitation: 0-Normal Activity


Paroxysmal Sweats: No Perspiration


Orientation: 0-Oriented


Tacttile Disturbances: 0-None


Auditory Disturbances: 0-None


Visual Disturbances: 0-None


Headache: 0-None Present


CIWA-Ar Total Score: 2

## 2020-07-22 RX ADMIN — Medication SCH MG: at 21:19

## 2020-07-22 RX ADMIN — METHADONE HYDROCHLORIDE SCH MG: 40 TABLET ORAL at 05:55

## 2020-07-22 RX ADMIN — NICOTINE POLACRILEX PRN MG: 2 GUM, CHEWING ORAL at 21:19

## 2020-07-22 RX ADMIN — NICOTINE SCH: 7 PATCH TRANSDERMAL at 09:54

## 2020-07-22 RX ADMIN — CLINDAMYCIN HYDROCHLORIDE SCH MG: 150 CAPSULE ORAL at 11:59

## 2020-07-22 RX ADMIN — Medication SCH TAB: at 09:54

## 2020-07-22 RX ADMIN — CLINDAMYCIN HYDROCHLORIDE SCH MG: 150 CAPSULE ORAL at 05:55

## 2020-07-22 RX ADMIN — HYDROXYZINE PAMOATE PRN MG: 25 CAPSULE ORAL at 09:54

## 2020-07-22 RX ADMIN — NICOTINE SCH MG: 21 PATCH TRANSDERMAL at 11:59

## 2020-07-22 RX ADMIN — NICOTINE POLACRILEX PRN MG: 2 GUM, CHEWING ORAL at 10:14

## 2020-07-22 RX ADMIN — CLINDAMYCIN HYDROCHLORIDE SCH MG: 150 CAPSULE ORAL at 17:02

## 2020-07-22 RX ADMIN — HYDROXYZINE PAMOATE PRN MG: 50 CAPSULE ORAL at 21:20

## 2020-07-22 NOTE — PN
[facility] Progress Note


Note: 


Patient is requesting an increase in his methadone to 50mg/day. His program, [facility]

was called. Dr. Matthias Alegria informed me, via Nurse Castaneda, that Mr. Cunningham has 

not been to [facility] since 6/22 and his dose at that time was 40mg. They do not 

recommend increasing the dose to 50mg. His counselor and the nursing staff have 

been informed.

## 2020-07-23 RX ADMIN — HYDROXYZINE PAMOATE PRN MG: 50 CAPSULE ORAL at 21:16

## 2020-07-23 RX ADMIN — Medication SCH: at 10:07

## 2020-07-23 RX ADMIN — NICOTINE POLACRILEX PRN MG: 2 GUM, CHEWING ORAL at 10:08

## 2020-07-23 RX ADMIN — NICOTINE POLACRILEX PRN MG: 2 GUM, CHEWING ORAL at 21:16

## 2020-07-23 RX ADMIN — NICOTINE SCH MG: 21 PATCH TRANSDERMAL at 10:07

## 2020-07-23 RX ADMIN — METHADONE HYDROCHLORIDE SCH MG: 40 TABLET ORAL at 06:19

## 2020-07-23 RX ADMIN — Medication SCH: at 21:15

## 2020-07-23 RX ADMIN — Medication SCH MG: at 21:15

## 2020-07-23 RX ADMIN — HYDROXYZINE PAMOATE PRN MG: 50 CAPSULE ORAL at 10:07

## 2020-07-23 RX ADMIN — NICOTINE POLACRILEX PRN MG: 2 GUM, CHEWING ORAL at 14:47

## 2020-07-23 RX ADMIN — BACITRACIN ZINC PRN GM: 500 OINTMENT TOPICAL at 10:06

## 2020-07-24 RX ADMIN — Medication SCH MG: at 21:39

## 2020-07-24 RX ADMIN — NICOTINE SCH MG: 21 PATCH TRANSDERMAL at 10:08

## 2020-07-24 RX ADMIN — METHADONE HYDROCHLORIDE SCH MG: 40 TABLET ORAL at 06:00

## 2020-07-24 RX ADMIN — NICOTINE POLACRILEX PRN MG: 2 GUM, CHEWING ORAL at 06:02

## 2020-07-24 RX ADMIN — Medication SCH: at 10:08

## 2020-07-24 RX ADMIN — HYDROXYZINE PAMOATE PRN MG: 50 CAPSULE ORAL at 06:02

## 2020-07-24 RX ADMIN — Medication SCH: at 21:40

## 2020-07-24 RX ADMIN — NICOTINE POLACRILEX PRN MG: 2 GUM, CHEWING ORAL at 20:45

## 2020-07-24 RX ADMIN — HYDROXYZINE PAMOATE PRN MG: 50 CAPSULE ORAL at 10:08

## 2020-07-24 RX ADMIN — NICOTINE POLACRILEX PRN MG: 2 GUM, CHEWING ORAL at 10:09

## 2020-07-24 RX ADMIN — HYDROXYZINE PAMOATE PRN MG: 50 CAPSULE ORAL at 21:40

## 2020-07-25 RX ADMIN — HYDROXYZINE PAMOATE PRN MG: 50 CAPSULE ORAL at 09:48

## 2020-07-25 RX ADMIN — HYDROXYZINE PAMOATE PRN MG: 50 CAPSULE ORAL at 18:40

## 2020-07-25 RX ADMIN — NICOTINE SCH MG: 21 PATCH TRANSDERMAL at 09:47

## 2020-07-25 RX ADMIN — Medication SCH: at 21:17

## 2020-07-25 RX ADMIN — NICOTINE POLACRILEX PRN MG: 2 GUM, CHEWING ORAL at 09:49

## 2020-07-25 RX ADMIN — NICOTINE POLACRILEX PRN MG: 2 GUM, CHEWING ORAL at 18:40

## 2020-07-25 RX ADMIN — METHADONE HYDROCHLORIDE SCH MG: 40 TABLET ORAL at 06:21

## 2020-07-25 RX ADMIN — Medication SCH MG: at 21:17

## 2020-07-25 RX ADMIN — BACITRACIN ZINC PRN GM: 500 OINTMENT TOPICAL at 18:06

## 2020-07-25 RX ADMIN — Medication SCH TAB: at 09:47

## 2020-07-26 RX ADMIN — NICOTINE SCH MG: 21 PATCH TRANSDERMAL at 09:55

## 2020-07-26 RX ADMIN — Medication SCH MG: at 21:41

## 2020-07-26 RX ADMIN — NICOTINE POLACRILEX PRN MG: 2 GUM, CHEWING ORAL at 06:23

## 2020-07-26 RX ADMIN — Medication SCH MG: at 21:40

## 2020-07-26 RX ADMIN — METHADONE HYDROCHLORIDE SCH MG: 40 TABLET ORAL at 06:22

## 2020-07-26 RX ADMIN — NICOTINE POLACRILEX PRN MG: 2 GUM, CHEWING ORAL at 09:57

## 2020-07-26 RX ADMIN — HYDROXYZINE PAMOATE PRN MG: 50 CAPSULE ORAL at 21:41

## 2020-07-26 RX ADMIN — NICOTINE POLACRILEX PRN MG: 2 GUM, CHEWING ORAL at 21:42

## 2020-07-26 RX ADMIN — Medication SCH: at 09:55

## 2020-07-26 RX ADMIN — HYDROXYZINE PAMOATE PRN MG: 50 CAPSULE ORAL at 09:56

## 2020-07-27 RX ADMIN — NICOTINE SCH MG: 21 PATCH TRANSDERMAL at 11:01

## 2020-07-27 RX ADMIN — HYDROXYZINE PAMOATE PRN MG: 50 CAPSULE ORAL at 06:14

## 2020-07-27 RX ADMIN — NICOTINE POLACRILEX PRN MG: 2 GUM, CHEWING ORAL at 21:19

## 2020-07-27 RX ADMIN — HYDROXYZINE PAMOATE PRN MG: 50 CAPSULE ORAL at 11:01

## 2020-07-27 RX ADMIN — Medication SCH: at 11:01

## 2020-07-27 RX ADMIN — Medication SCH MG: at 21:18

## 2020-07-27 RX ADMIN — METHADONE HYDROCHLORIDE SCH MG: 40 TABLET ORAL at 06:13

## 2020-07-27 RX ADMIN — HYDROXYZINE PAMOATE PRN MG: 50 CAPSULE ORAL at 21:19

## 2020-07-27 RX ADMIN — Medication SCH: at 21:19

## 2020-07-28 RX ADMIN — HYDROXYZINE PAMOATE PRN MG: 50 CAPSULE ORAL at 21:34

## 2020-07-28 RX ADMIN — Medication SCH: at 09:58

## 2020-07-28 RX ADMIN — NICOTINE POLACRILEX PRN MG: 2 GUM, CHEWING ORAL at 09:58

## 2020-07-28 RX ADMIN — Medication SCH MG: at 21:33

## 2020-07-28 RX ADMIN — NICOTINE SCH MG: 21 PATCH TRANSDERMAL at 09:57

## 2020-07-28 RX ADMIN — Medication SCH: at 21:34

## 2020-07-28 RX ADMIN — NICOTINE POLACRILEX PRN MG: 2 GUM, CHEWING ORAL at 21:34

## 2020-07-28 RX ADMIN — HYDROXYZINE PAMOATE PRN MG: 50 CAPSULE ORAL at 09:57

## 2020-07-28 RX ADMIN — METHADONE HYDROCHLORIDE SCH MG: 40 TABLET ORAL at 06:30

## 2020-07-29 RX ADMIN — NICOTINE SCH MG: 21 PATCH TRANSDERMAL at 09:49

## 2020-07-29 RX ADMIN — NICOTINE POLACRILEX PRN MG: 2 GUM, CHEWING ORAL at 09:51

## 2020-07-29 RX ADMIN — HYDROXYZINE PAMOATE PRN MG: 50 CAPSULE ORAL at 09:49

## 2020-07-29 RX ADMIN — METHADONE HYDROCHLORIDE SCH MG: 40 TABLET ORAL at 06:06

## 2020-07-29 RX ADMIN — Medication SCH MG: at 21:05

## 2020-07-29 RX ADMIN — HYDROXYZINE PAMOATE PRN MG: 50 CAPSULE ORAL at 21:05

## 2020-07-29 RX ADMIN — NICOTINE POLACRILEX PRN MG: 2 GUM, CHEWING ORAL at 21:05

## 2020-07-29 RX ADMIN — Medication SCH: at 09:59

## 2020-07-30 RX ADMIN — Medication SCH: at 09:41

## 2020-07-30 RX ADMIN — NICOTINE POLACRILEX PRN MG: 2 GUM, CHEWING ORAL at 21:16

## 2020-07-30 RX ADMIN — HYDROXYZINE PAMOATE PRN MG: 50 CAPSULE ORAL at 09:42

## 2020-07-30 RX ADMIN — HYDROXYZINE PAMOATE PRN MG: 50 CAPSULE ORAL at 21:15

## 2020-07-30 RX ADMIN — NICOTINE POLACRILEX PRN MG: 2 GUM, CHEWING ORAL at 09:43

## 2020-07-30 RX ADMIN — Medication SCH MG: at 21:15

## 2020-07-30 RX ADMIN — Medication SCH: at 21:16

## 2020-07-30 RX ADMIN — METHADONE HYDROCHLORIDE SCH MG: 40 TABLET ORAL at 06:14

## 2020-07-30 RX ADMIN — NICOTINE SCH MG: 21 PATCH TRANSDERMAL at 09:42

## 2020-07-31 RX ADMIN — NICOTINE POLACRILEX PRN MG: 2 GUM, CHEWING ORAL at 21:02

## 2020-07-31 RX ADMIN — HYDROXYZINE PAMOATE PRN MG: 50 CAPSULE ORAL at 21:01

## 2020-07-31 RX ADMIN — Medication SCH: at 10:11

## 2020-07-31 RX ADMIN — HYDROXYZINE PAMOATE PRN MG: 50 CAPSULE ORAL at 10:11

## 2020-07-31 RX ADMIN — Medication SCH: at 21:02

## 2020-07-31 RX ADMIN — Medication SCH MG: at 21:01

## 2020-07-31 RX ADMIN — METHADONE HYDROCHLORIDE SCH MG: 40 TABLET ORAL at 06:07

## 2020-07-31 RX ADMIN — NICOTINE SCH MG: 21 PATCH TRANSDERMAL at 10:11

## 2020-07-31 RX ADMIN — NICOTINE POLACRILEX PRN MG: 2 GUM, CHEWING ORAL at 10:12

## 2020-08-01 RX ADMIN — HYDROXYZINE PAMOATE PRN MG: 50 CAPSULE ORAL at 09:44

## 2020-08-01 RX ADMIN — METHADONE HYDROCHLORIDE SCH MG: 40 TABLET ORAL at 06:28

## 2020-08-01 RX ADMIN — Medication SCH: at 09:45

## 2020-08-01 RX ADMIN — NICOTINE POLACRILEX PRN MG: 2 GUM, CHEWING ORAL at 21:29

## 2020-08-01 RX ADMIN — Medication SCH: at 21:29

## 2020-08-01 RX ADMIN — NICOTINE POLACRILEX PRN MG: 2 GUM, CHEWING ORAL at 09:45

## 2020-08-01 RX ADMIN — HYDROXYZINE PAMOATE PRN MG: 50 CAPSULE ORAL at 21:29

## 2020-08-01 RX ADMIN — Medication SCH MG: at 21:29

## 2020-08-01 RX ADMIN — NICOTINE SCH MG: 21 PATCH TRANSDERMAL at 09:44

## 2020-08-02 RX ADMIN — NICOTINE POLACRILEX PRN MG: 2 GUM, CHEWING ORAL at 09:48

## 2020-08-02 RX ADMIN — NICOTINE SCH MG: 21 PATCH TRANSDERMAL at 09:47

## 2020-08-02 RX ADMIN — Medication SCH: at 09:17

## 2020-08-02 RX ADMIN — METHADONE HYDROCHLORIDE SCH MG: 40 TABLET ORAL at 06:10

## 2020-08-02 RX ADMIN — HYDROXYZINE PAMOATE PRN MG: 50 CAPSULE ORAL at 21:08

## 2020-08-02 RX ADMIN — NICOTINE POLACRILEX PRN MG: 2 GUM, CHEWING ORAL at 21:08

## 2020-08-02 RX ADMIN — HYDROXYZINE PAMOATE PRN MG: 50 CAPSULE ORAL at 09:47

## 2020-08-02 RX ADMIN — Medication SCH MG: at 21:07

## 2020-08-02 RX ADMIN — Medication SCH: at 21:08

## 2020-08-03 RX ADMIN — HYDROXYZINE PAMOATE PRN MG: 50 CAPSULE ORAL at 21:21

## 2020-08-03 RX ADMIN — HYDROXYZINE PAMOATE PRN MG: 50 CAPSULE ORAL at 09:47

## 2020-08-03 RX ADMIN — Medication SCH: at 21:22

## 2020-08-03 RX ADMIN — METHADONE HYDROCHLORIDE SCH MG: 40 TABLET ORAL at 06:00

## 2020-08-03 RX ADMIN — NICOTINE POLACRILEX PRN MG: 2 GUM, CHEWING ORAL at 09:47

## 2020-08-03 RX ADMIN — Medication SCH MG: at 21:21

## 2020-08-03 RX ADMIN — NICOTINE SCH MG: 21 PATCH TRANSDERMAL at 09:47

## 2020-08-03 RX ADMIN — Medication SCH: at 09:47

## 2020-08-04 VITALS — TEMPERATURE: 97.8 F

## 2020-08-04 RX ADMIN — NICOTINE POLACRILEX PRN MG: 2 GUM, CHEWING ORAL at 09:56

## 2020-08-04 RX ADMIN — Medication SCH: at 10:26

## 2020-08-04 RX ADMIN — Medication SCH MG: at 21:12

## 2020-08-04 RX ADMIN — NICOTINE SCH MG: 21 PATCH TRANSDERMAL at 09:55

## 2020-08-04 RX ADMIN — HYDROXYZINE PAMOATE PRN MG: 50 CAPSULE ORAL at 21:13

## 2020-08-04 RX ADMIN — HYDROXYZINE PAMOATE PRN MG: 50 CAPSULE ORAL at 09:55

## 2020-08-04 RX ADMIN — NICOTINE POLACRILEX PRN MG: 2 GUM, CHEWING ORAL at 21:13

## 2020-08-04 RX ADMIN — METHADONE HYDROCHLORIDE SCH MG: 40 TABLET ORAL at 06:29

## 2020-08-05 VITALS — HEART RATE: 72 BPM | DIASTOLIC BLOOD PRESSURE: 71 MMHG | SYSTOLIC BLOOD PRESSURE: 115 MMHG

## 2020-08-05 RX ADMIN — NICOTINE SCH: 21 PATCH TRANSDERMAL at 09:49

## 2020-08-05 RX ADMIN — HYDROXYZINE PAMOATE PRN MG: 50 CAPSULE ORAL at 09:48

## 2020-08-05 RX ADMIN — METHADONE HYDROCHLORIDE SCH MG: 40 TABLET ORAL at 06:17

## 2020-08-05 RX ADMIN — Medication SCH: at 09:49

## 2020-08-05 NOTE — DS
[facility] Discharge Summary





- [facility] Discharge Summary


Admission Date: 07/21/20


Discharge Date: 08/05/20





- History


Present History: Alcohol dependence, Opioid dependence


Pertinent Past History: 


39  y.o. male  with benzo  and alcohol  use. 


reports alcohol  use  since  age 14  , current  daily  use 8  beers/day since 

Jan 2020, reports  tremors  if  not drinking, denies  blackouts  and  seizures. 

In January  he was  drinking  1  pint  liquor  /day; 5 years prior he was  

drinking  socially.


cocaine  - 1  -2  gr  /day  via  IV  in   [facility]  ,  abscess  now  and  none  prior 

, denies  endocarditis  or seizures  .  First age of use  14 , IV  since age  22




heroin  since age  21, OD x 2  most recently  2011, narcan  by EMS, has Narcan 

at  home  and  knows  how  to  use  it. Longest  sobriety  [phone]  while  

incarcerated  for drug-related  charges. In  [facility]  intermittently  , most 

recently @ [facility]  , current  daily  dose  40  mg  due  to  many  missed. 


cannabis  - age  11  , currently  not  daily use .


tried  PCP  , MDMA  , crystal   metamphetamines  ,  psilocybine , crack  cocaine

 


benzo  -  since age  21, had  rx  for a  while  ( 2  yrs ), stopped  

2018,restarted illicit  use  since  Nov 2019  ,  xanax  2  sticks /day each  2 

mg   up  to 5  /day  ,  reports  w/d seizure  most  recently  2014 , never  on  

anti-epileptics.


Pt  reports  he  had  right  arm swelling @ IVDU  site; went o [facility]  , given rx for Clindamycin  after  one-time  IV dose  of Clindamycin .








PMHX  : Hep  C  dx  10 years ago  no tx  , states  he is  HIV negative  , denies

tx for STDs 


PSX : left  neph[place]  2/2  [place]  w/  rib frx  /  punctured  

kidney 


PSYCH :  denies  .  


SHX :  lives w/  mother  and  oldest son  age  23 .  Denies current  legal 

issues  . Unemployed  . 








- Discharge Physical Exam


Vital Signs: 


                                   Vital Signs











Temperature  97.8 F   08/05/20 06:16


 


Pulse Rate  72   08/05/20 06:16


 


Respiratory Rate  18   08/05/20 06:16


 


Blood Pressure  115/71   08/05/20 06:16


 


O2 Sat by Pulse Oximetry (%)  99   08/05/20 06:16











Pertinent Admission Physical Exam Findings: 


 Physical


General Appearance: No apparent distress


HEENTM: EOMI, Normocephalic, 


Respiratory: No Respiratory Distress, No Accessory Muscle Use


Neck:  supple


Abdominal: +BS


Musculoskeletal: Gait Steady


Neurological: CN 2-12 Motor Strength 5/5, 


Integumentary: Warm, Track Marks (eleazar UE)








- Treatment


Discharge Condition: Outpatient referral accepted (medically stable for 

discharge.He is going to [facility])


Hospital Course: 


Patient attended groups, had 1:1 with his counselor. He was adherent to his 

medical regimen and treatment plan. He had no acute or urgent medical problems 

while in rehab. 








- Medication


Discharge Medications: 


Ambulatory Orders





Clindamycin [Cleocin -] 600 mg PO Q6H 07/15/20 











- Medication-Assisted Treatment (MAT)


Medication-Assisted Treatment (MAT): Yes


MAT Follow-up Referral: 


[facility] at [facility]








- Discharge Instructions


Diet, activity, other medical instructions: 





Diet: as tolerated





Activity: as tolerated





Other medical instructions: Please follow up with aftercare referral. 








- Diagnosis


(1) Alcohol use disorder


Current Visit: No   Status: Chronic   





(2) Opioid dependence on agonist therapy


Current Visit: No   Status: Chronic   





- Follow-up Referral


Minutes to complete discharge: 15





- AMA


Did Patient Leave Against Medical Advice: No

## 2021-02-19 ENCOUNTER — HOSPITAL ENCOUNTER (INPATIENT)
Dept: HOSPITAL 74 - YASAS | Age: 41
LOS: 4 days | Discharge: HOME | DRG: 773 | End: 2021-02-23
Attending: ALLERGY & IMMUNOLOGY | Admitting: ALLERGY & IMMUNOLOGY
Payer: COMMERCIAL

## 2021-02-19 VITALS — BODY MASS INDEX: 28.5 KG/M2

## 2021-02-19 DIAGNOSIS — R63.8: ICD-10-CM

## 2021-02-19 DIAGNOSIS — F17.210: ICD-10-CM

## 2021-02-19 DIAGNOSIS — F13.230: ICD-10-CM

## 2021-02-19 DIAGNOSIS — L03.113: ICD-10-CM

## 2021-02-19 DIAGNOSIS — F11.23: Primary | ICD-10-CM

## 2021-02-19 DIAGNOSIS — G89.29: ICD-10-CM

## 2021-02-19 DIAGNOSIS — B18.2: ICD-10-CM

## 2021-02-19 DIAGNOSIS — F14.20: ICD-10-CM

## 2021-02-19 DIAGNOSIS — Z90.5: ICD-10-CM

## 2021-02-19 DIAGNOSIS — Z86.69: ICD-10-CM

## 2021-02-19 DIAGNOSIS — M54.5: ICD-10-CM

## 2021-02-19 DIAGNOSIS — Z89.022: ICD-10-CM

## 2021-02-19 DIAGNOSIS — G47.00: ICD-10-CM

## 2021-02-19 PROCEDURE — C9803 HOPD COVID-19 SPEC COLLECT: HCPCS

## 2021-02-19 PROCEDURE — HZ2ZZZZ DETOXIFICATION SERVICES FOR SUBSTANCE ABUSE TREATMENT: ICD-10-PCS | Performed by: ALLERGY & IMMUNOLOGY

## 2021-02-19 PROCEDURE — U0003 INFECTIOUS AGENT DETECTION BY NUCLEIC ACID (DNA OR RNA); SEVERE ACUTE RESPIRATORY SYNDROME CORONAVIRUS 2 (SARS-COV-2) (CORONAVIRUS DISEASE [COVID-19]), AMPLIFIED PROBE TECHNIQUE, MAKING USE OF HIGH THROUGHPUT TECHNOLOGIES AS DESCRIBED BY CMS-2020-01-R: HCPCS

## 2021-02-19 RX ADMIN — Medication SCH MG: at 22:49

## 2021-02-20 LAB
ALBUMIN SERPL-MCNC: 3.2 G/DL (ref 3.4–5)
ALP SERPL-CCNC: 68 U/L (ref 45–117)
ALT SERPL-CCNC: 79 U/L (ref 13–61)
ANION GAP SERPL CALC-SCNC: 7 MMOL/L (ref 8–16)
AST SERPL-CCNC: 63 U/L (ref 15–37)
BILIRUB SERPL-MCNC: 0.9 MG/DL (ref 0.2–1)
BUN SERPL-MCNC: 22.1 MG/DL (ref 7–18)
CALCIUM SERPL-MCNC: 8.8 MG/DL (ref 8.5–10.1)
CHLORIDE SERPL-SCNC: 102 MMOL/L (ref 98–107)
CO2 SERPL-SCNC: 28 MMOL/L (ref 21–32)
CREAT SERPL-MCNC: 1.1 MG/DL (ref 0.55–1.3)
DEPRECATED RDW RBC AUTO: 12.5 % (ref 11.9–15.9)
GLUCOSE SERPL-MCNC: 104 MG/DL (ref 74–106)
HCT VFR BLD CALC: 36.3 % (ref 35.4–49)
HGB BLD-MCNC: 12.6 GM/DL (ref 11.7–16.9)
MCH RBC QN AUTO: 30.3 PG (ref 25.7–33.7)
MCHC RBC AUTO-ENTMCNC: 34.8 G/DL (ref 32–35.9)
MCV RBC: 86.9 FL (ref 80–96)
PLATELET # BLD AUTO: 187 K/MM3 (ref 134–434)
PMV BLD: 8.6 FL (ref 7.5–11.1)
POTASSIUM SERPLBLD-SCNC: 4 MMOL/L (ref 3.5–5.1)
PROT SERPL-MCNC: 8.1 G/DL (ref 6.4–8.2)
RBC # BLD AUTO: 4.18 M/MM3 (ref 4–5.6)
SODIUM SERPL-SCNC: 136 MMOL/L (ref 136–145)
WBC # BLD AUTO: 5.8 K/MM3 (ref 4–10)

## 2021-02-20 RX ADMIN — Medication SCH MG: at 22:17

## 2021-02-20 RX ADMIN — NICOTINE SCH MG: 21 PATCH TRANSDERMAL at 10:26

## 2021-02-20 RX ADMIN — Medication SCH TAB: at 10:45

## 2021-02-20 RX ADMIN — METHOCARBAMOL PRN MG: 500 TABLET ORAL at 22:17

## 2021-02-21 RX ADMIN — NICOTINE SCH: 21 PATCH TRANSDERMAL at 10:13

## 2021-02-21 RX ADMIN — Medication SCH TAB: at 10:13

## 2021-02-21 RX ADMIN — Medication SCH MG: at 22:22

## 2021-02-21 RX ADMIN — Medication SCH MG: at 22:23

## 2021-02-21 RX ADMIN — METHOCARBAMOL PRN MG: 500 TABLET ORAL at 18:05

## 2021-02-22 LAB
APPEARANCE UR: CLEAR
BILIRUB UR STRIP.AUTO-MCNC: NEGATIVE MG/DL
COLOR UR: YELLOW
KETONES UR QL STRIP: NEGATIVE
LEUKOCYTE ESTERASE UR QL STRIP.AUTO: NEGATIVE
NITRITE UR QL STRIP: NEGATIVE
PH UR: 7 [PH] (ref 5–8)
PROT UR QL STRIP: NEGATIVE
PROT UR QL STRIP: NEGATIVE
SP GR UR: 1.02 (ref 1.01–1.03)
UROBILINOGEN UR STRIP-MCNC: 1 MG/DL (ref 0.2–1)

## 2021-02-22 RX ADMIN — Medication SCH MG: at 23:18

## 2021-02-22 RX ADMIN — Medication SCH TAB: at 10:13

## 2021-02-22 RX ADMIN — NICOTINE SCH: 21 PATCH TRANSDERMAL at 10:13

## 2021-02-22 RX ADMIN — LIDOCAINE SCH PATCH: 50 PATCH TOPICAL at 12:43

## 2021-02-22 RX ADMIN — METHOCARBAMOL PRN MG: 500 TABLET ORAL at 18:01

## 2021-02-22 RX ADMIN — Medication SCH MG: at 21:08

## 2021-02-23 VITALS — SYSTOLIC BLOOD PRESSURE: 150 MMHG | HEART RATE: 70 BPM | DIASTOLIC BLOOD PRESSURE: 84 MMHG | TEMPERATURE: 98 F

## 2021-02-23 RX ADMIN — METHOCARBAMOL PRN MG: 500 TABLET ORAL at 09:20

## 2021-02-23 RX ADMIN — Medication SCH TAB: at 09:19

## 2021-02-23 RX ADMIN — LIDOCAINE SCH PATCH: 50 PATCH TOPICAL at 09:20

## 2021-02-23 RX ADMIN — NICOTINE SCH: 21 PATCH TRANSDERMAL at 09:19

## 2021-12-28 ENCOUNTER — INPATIENT (INPATIENT)
Facility: HOSPITAL | Age: 41
LOS: 1 days | Discharge: HOME | DRG: 918 | End: 2021-12-30
Attending: HOSPITALIST | Admitting: STUDENT IN AN ORGANIZED HEALTH CARE EDUCATION/TRAINING PROGRAM
Payer: COMMERCIAL

## 2021-12-28 VITALS
RESPIRATION RATE: 16 BRPM | WEIGHT: 176.37 LBS | HEIGHT: 68 IN | HEART RATE: 84 BPM | TEMPERATURE: 98 F | SYSTOLIC BLOOD PRESSURE: 108 MMHG | OXYGEN SATURATION: 98 % | DIASTOLIC BLOOD PRESSURE: 78 MMHG

## 2021-12-28 DIAGNOSIS — R79.89 OTHER SPECIFIED ABNORMAL FINDINGS OF BLOOD CHEMISTRY: ICD-10-CM

## 2021-12-28 DIAGNOSIS — Z29.9 ENCOUNTER FOR PROPHYLACTIC MEASURES, UNSPECIFIED: ICD-10-CM

## 2021-12-28 DIAGNOSIS — B19.20 UNSPECIFIED VIRAL HEPATITIS C WITHOUT HEPATIC COMA: ICD-10-CM

## 2021-12-28 DIAGNOSIS — L03.012 CELLULITIS OF LEFT FINGER: ICD-10-CM

## 2021-12-28 DIAGNOSIS — F19.90 OTHER PSYCHOACTIVE SUBSTANCE USE, UNSPECIFIED, UNCOMPLICATED: ICD-10-CM

## 2021-12-28 LAB
ALBUMIN SERPL ELPH-MCNC: 3.6 G/DL — SIGNIFICANT CHANGE UP (ref 3.4–5)
ALP SERPL-CCNC: 70 U/L — SIGNIFICANT CHANGE UP (ref 40–120)
ALT FLD-CCNC: 34 U/L — SIGNIFICANT CHANGE UP (ref 12–42)
ANION GAP SERPL CALC-SCNC: 10 MMOL/L — SIGNIFICANT CHANGE UP (ref 9–16)
APTT BLD: 30.8 SEC — SIGNIFICANT CHANGE UP (ref 27.5–35.5)
AST SERPL-CCNC: 47 U/L — HIGH (ref 15–37)
BILIRUB SERPL-MCNC: 0.4 MG/DL — SIGNIFICANT CHANGE UP (ref 0.2–1.2)
BUN SERPL-MCNC: 16 MG/DL — SIGNIFICANT CHANGE UP (ref 7–23)
CALCIUM SERPL-MCNC: 9.3 MG/DL — SIGNIFICANT CHANGE UP (ref 8.5–10.5)
CHLORIDE SERPL-SCNC: 103 MMOL/L — SIGNIFICANT CHANGE UP (ref 96–108)
CO2 SERPL-SCNC: 26 MMOL/L — SIGNIFICANT CHANGE UP (ref 22–31)
CREAT SERPL-MCNC: 1.12 MG/DL — SIGNIFICANT CHANGE UP (ref 0.5–1.3)
GLUCOSE SERPL-MCNC: 140 MG/DL — HIGH (ref 70–99)
HCT VFR BLD CALC: 40.6 % — SIGNIFICANT CHANGE UP (ref 39–50)
HGB BLD-MCNC: 13.9 G/DL — SIGNIFICANT CHANGE UP (ref 13–17)
INR BLD: 1.17 — HIGH (ref 0.88–1.16)
LACTATE SERPL-SCNC: 0.9 MMOL/L — SIGNIFICANT CHANGE UP (ref 0.5–2)
LACTATE SERPL-SCNC: 2.2 MMOL/L — HIGH (ref 0.4–2)
MCHC RBC-ENTMCNC: 30.3 PG — SIGNIFICANT CHANGE UP (ref 27–34)
MCHC RBC-ENTMCNC: 34.2 GM/DL — SIGNIFICANT CHANGE UP (ref 32–36)
MCV RBC AUTO: 88.5 FL — SIGNIFICANT CHANGE UP (ref 80–100)
NRBC # BLD: 0 /100 WBCS — SIGNIFICANT CHANGE UP (ref 0–0)
PLATELET # BLD AUTO: 233 K/UL — SIGNIFICANT CHANGE UP (ref 150–400)
POTASSIUM SERPL-MCNC: 4.4 MMOL/L — SIGNIFICANT CHANGE UP (ref 3.5–5.3)
POTASSIUM SERPL-SCNC: 4.4 MMOL/L — SIGNIFICANT CHANGE UP (ref 3.5–5.3)
PROT SERPL-MCNC: 8.8 G/DL — HIGH (ref 6.4–8.2)
PROTHROM AB SERPL-ACNC: 13.9 SEC — HIGH (ref 10.6–13.6)
RBC # BLD: 4.59 M/UL — SIGNIFICANT CHANGE UP (ref 4.2–5.8)
RBC # FLD: 12.3 % — SIGNIFICANT CHANGE UP (ref 10.3–14.5)
SARS-COV-2 RNA SPEC QL NAA+PROBE: SIGNIFICANT CHANGE UP
SODIUM SERPL-SCNC: 139 MMOL/L — SIGNIFICANT CHANGE UP (ref 132–145)
WBC # BLD: 7.7 K/UL — SIGNIFICANT CHANGE UP (ref 3.8–10.5)
WBC # FLD AUTO: 7.7 K/UL — SIGNIFICANT CHANGE UP (ref 3.8–10.5)

## 2021-12-28 PROCEDURE — 99223 1ST HOSP IP/OBS HIGH 75: CPT | Mod: GC

## 2021-12-28 PROCEDURE — 99285 EMERGENCY DEPT VISIT HI MDM: CPT | Mod: 25

## 2021-12-28 PROCEDURE — 73140 X-RAY EXAM OF FINGER(S): CPT | Mod: 26,LT

## 2021-12-28 PROCEDURE — 10061 I&D ABSCESS COMP/MULTIPLE: CPT

## 2021-12-28 RX ORDER — VANCOMYCIN HCL 1 G
1250 VIAL (EA) INTRAVENOUS ONCE
Refills: 0 | Status: COMPLETED | OUTPATIENT
Start: 2021-12-28 | End: 2021-12-28

## 2021-12-28 RX ORDER — ACETAMINOPHEN 500 MG
650 TABLET ORAL EVERY 6 HOURS
Refills: 0 | Status: DISCONTINUED | OUTPATIENT
Start: 2021-12-28 | End: 2021-12-30

## 2021-12-28 RX ORDER — VANCOMYCIN HCL 1 G
1250 VIAL (EA) INTRAVENOUS EVERY 12 HOURS
Refills: 0 | Status: DISCONTINUED | OUTPATIENT
Start: 2021-12-29 | End: 2021-12-30

## 2021-12-28 RX ORDER — METHADONE HYDROCHLORIDE 40 MG/1
40 TABLET ORAL DAILY
Refills: 0 | Status: DISCONTINUED | OUTPATIENT
Start: 2021-12-29 | End: 2021-12-30

## 2021-12-28 RX ORDER — VANCOMYCIN HCL 1 G
1250 VIAL (EA) INTRAVENOUS ONCE
Refills: 0 | Status: DISCONTINUED | OUTPATIENT
Start: 2021-12-28 | End: 2021-12-28

## 2021-12-28 RX ORDER — LANOLIN ALCOHOL/MO/W.PET/CERES
3 CREAM (GRAM) TOPICAL AT BEDTIME
Refills: 0 | Status: DISCONTINUED | OUTPATIENT
Start: 2021-12-28 | End: 2021-12-30

## 2021-12-28 RX ORDER — CEFTRIAXONE 500 MG/1
1000 INJECTION, POWDER, FOR SOLUTION INTRAMUSCULAR; INTRAVENOUS EVERY 24 HOURS
Refills: 0 | Status: DISCONTINUED | OUTPATIENT
Start: 2021-12-28 | End: 2021-12-30

## 2021-12-28 RX ORDER — SODIUM CHLORIDE 9 MG/ML
1000 INJECTION INTRAMUSCULAR; INTRAVENOUS; SUBCUTANEOUS ONCE
Refills: 0 | Status: COMPLETED | OUTPATIENT
Start: 2021-12-28 | End: 2021-12-28

## 2021-12-28 RX ORDER — ONDANSETRON 8 MG/1
4 TABLET, FILM COATED ORAL EVERY 8 HOURS
Refills: 0 | Status: DISCONTINUED | OUTPATIENT
Start: 2021-12-28 | End: 2021-12-30

## 2021-12-28 RX ADMIN — Medication 650 MILLIGRAM(S): at 21:12

## 2021-12-28 RX ADMIN — Medication 3 MILLIGRAM(S): at 22:21

## 2021-12-28 RX ADMIN — CEFTRIAXONE 100 MILLIGRAM(S): 500 INJECTION, POWDER, FOR SOLUTION INTRAMUSCULAR; INTRAVENOUS at 22:15

## 2021-12-28 RX ADMIN — Medication 650 MILLIGRAM(S): at 22:12

## 2021-12-28 RX ADMIN — SODIUM CHLORIDE 1000 MILLILITER(S): 9 INJECTION INTRAMUSCULAR; INTRAVENOUS; SUBCUTANEOUS at 13:55

## 2021-12-28 RX ADMIN — Medication 166.67 MILLIGRAM(S): at 13:55

## 2021-12-28 NOTE — H&P ADULT - PROBLEM SELECTOR PLAN 1
Presented with 1 week history of left middle finger pain swelling discharge, with xray demonstrating swelling, consistent with cellulitis, likely 2/2 spider bite.   -follow blood cultures  -follow wound cultures  -continue with vancomycin 1250mg QD, check trough before fourth dose.   -follow hand surgery recommendations. Presented with 1 week history of left middle finger pain swelling discharge, with xray demonstrating swelling, consistent with cellulitis, likely 2/2 spider bite. Given purulent drainage, fluctuance on exam, and history of IVDU need to cover for MRSA.   -follow wound cultures  -continue with vancomycin 1250mg QD, check trough before fourth dose.   -follow hand surgery recommendations. Presented with 1 week history of left middle finger pain swelling discharge, with xray demonstrating swelling, consistent with cellulitis, likely 2/2 spider bite. Given purulent drainage, fluctuance on exam, and history of IVDU need to cover for MRSA.   -follow wound cultures  -continue with vancomycin 1250mg BID, check trough before fourth dose.   -follow hand surgery recommendations. Presented with 1 week history of left middle finger pain swelling discharge, with xray demonstrating swelling, consistent with cellulitis, likely 2/2 spider bite. Given purulent drainage, fluctuance on exam, and history of IVDU need to cover for MRSA.   -follow wound cultures  -continue with vancomycin 1250mg BID, check trough before fourth dose.   -Ceftriaxone 1g for gram negative coverage.   -follow hand surgery recommendations.

## 2021-12-28 NOTE — H&P ADULT - NSHPLABSRESULTS_GEN_ALL_CORE
.  LABS:                         13.9   7.70  )-----------( 233      ( 28 Dec 2021 14:04 )             40.6     12-28    139  |  103  |  16  ----------------------------<  140<H>  4.4   |  26  |  1.12    Ca    9.3      28 Dec 2021 14:04    TPro  8.8<H>  /  Alb  3.6  /  TBili  0.4  /  DBili  x   /  AST  47<H>  /  ALT  34  /  AlkPhos  70  12-28    PT/INR - ( 28 Dec 2021 14:04 )   PT: 13.9 sec;   INR: 1.17          PTT - ( 28 Dec 2021 14:04 )  PTT:30.8 sec          Lactate, Blood: 2.2 mmoL/L (12-28 @ 14:04)      RADIOLOGY, EKG & ADDITIONAL TESTS: Reviewed.

## 2021-12-28 NOTE — H&P ADULT - NSHPPHYSICALEXAM_GEN_ALL_CORE
.  VITAL SIGNS:  T(C): 36.7 (12-28-21 @ 18:54), Max: 37.1 (12-28-21 @ 16:15)  T(F): 98.1 (12-28-21 @ 18:54), Max: 98.7 (12-28-21 @ 16:15)  HR: 69 (12-28-21 @ 18:54) (69 - 84)  BP: 121/85 (12-28-21 @ 18:54) (108/78 - 138/69)  BP(mean): --  RR: 18 (12-28-21 @ 18:54) (16 - 18)  SpO2: 96% (12-28-21 @ 18:54) (96% - 98%)  Wt(kg): --    PHYSICAL EXAM:    Constitutional: WDWN resting comfortably in bed; NAD  Head: NC/AT  Eyes: PERRL, EOMI, clear conjunctiva  ENT: no nasal discharge; uvula midline, no oropharyngeal erythema or exudates; MMM  Neck: supple; no JVD or thyromegaly  Respiratory: CTA B/L; no W/R/R, no retractions  Cardiac: +S1/S2; RRR; no M/R/G; PMI non-displaced  Gastrointestinal: soft, NT/ND; no rebound or guarding; +BSx4  Genitourinary: normal external genitalia  Back: spine midline, no bony tenderness or step-offs; no CVAT B/L  Extremities: WWP, no clubbing or cyanosis; no peripheral edema  Musculoskeletal: NROM x4; no joint swelling, tenderness or erythema  Vascular: 2+ radial, femoral, DP/PT pulses B/L  Dermatologic: skin warm, dry and intact; no rashes, wounds, or scars  Lymphatic: no submandibular or cervical LAD  Neurologic: AAOx3; CNII-XII grossly intact; no focal deficits  Psychiatric: affect and characteristics of appearance, verbalizations, behaviors are appropriate .  VITAL SIGNS:  T(C): 36.7 (12-28-21 @ 18:54), Max: 37.1 (12-28-21 @ 16:15)  T(F): 98.1 (12-28-21 @ 18:54), Max: 98.7 (12-28-21 @ 16:15)  HR: 69 (12-28-21 @ 18:54) (69 - 84)  BP: 121/85 (12-28-21 @ 18:54) (108/78 - 138/69)  BP(mean): --  RR: 18 (12-28-21 @ 18:54) (16 - 18)  SpO2: 96% (12-28-21 @ 18:54) (96% - 98%)  Wt(kg): --    PHYSICAL EXAM:    Constitutional: NAD  Head: NC/AT  Eyes: PERRL, EOMI, clear conjunctiva  ENT: no nasal discharge; uvula midline, no oropharyngeal erythema or exudates; MMM  Neck: supple; no JVD or thyromegaly  Respiratory: CTA B/L; no W/R/R, no retractions  Cardiac: +S1/S2; RRR; no M/R/G; PMI non-displaced  Gastrointestinal: soft, NT/ND; no rebound or guarding; +BSx4  Extremities: WWP, no clubbing or cyanosis; no peripheral edema  Musculoskeletal: Lft middle finger erythematous with severe tenderness to palpation. No streaking noted. Right dorsum with swelling and decrased ROM.   Vascular: 2+ radial, femoral, DP/PT pulses B/L  Neurologic: AAOx3; CNII-XII grossly intact; no focal deficits

## 2021-12-28 NOTE — CONSULT NOTE ADULT - SUBJECTIVE AND OBJECTIVE BOX
cellulitis and pustule with whitish pus consistent with staph with erythema, swelling and  tenderness. of left middle finger middle phalanx level  Patient feels that it may be due to a spider bite.

## 2021-12-28 NOTE — ED ADULT NURSE REASSESSMENT NOTE - NS ED NURSE REASSESS COMMENT FT1
Pt unhappy and aggressive towards staff because not allowed to go outside with IV in hand getachew riley

## 2021-12-28 NOTE — ED ADULT TRIAGE NOTE - CHIEF COMPLAINT QUOTE
patient walk in with left middle finger infection; was  given shot of clindamycin by pcp at  Sunday but now worse; red, swollen, painful and white pus noted under skin

## 2021-12-28 NOTE — ED PROVIDER NOTE - OBJECTIVE STATEMENT
42 y/o male presents to the ED with complaints of left middle finger swelling, redness, and pain. Patient was given a shot of Clindamycin at  2 days ago, but the area has worsen. Currently taking Methadone. Denies fever or chills.

## 2021-12-28 NOTE — H&P ADULT - HISTORY OF PRESENT ILLNESS
Patient is a 41 year old male with medical history significant for, presenting with a 1 week history of left middle finger swelling, pain, and discharge.       ED Course:   Vitals: /78, HR 84, RR 16 98%, T 98.3  Labs: WBC 7.7, Hg 13.3, INR 1.17, Protein 8.8, AST 47, Lactate 2.2, xray consistent with Severe soft tissue swelling diffusely surrounding the third   digit PIP joint.  Course: IV Vanc 1250mg given, NaCl 1000ml x 1.  Consults: Hand surgery-recommending IV Vanc, I&D done.        Patient is a 41 year old male with medical history significant fo hepatitis C, untreated, and IVDU (heroine, last used in May), now on methadone, presenting with a 1 week history of left middle finger swelling, pain, and discharge. Notes that on Thursday morning he woke up with a small red lesion on the dorsum of his left middle finger that he describes as very painful and very pruritis. It worsened to the point of significant swelling, pain, and increased redness on Sunday, prompting him to visit [facility], where he was told that the lesion appeared to be a spider bite, and was instructed to take a course of clindamycin and take motrin. He never picked up the Clinda, and woke up on Tuesday (12/28) AM with increased swelling, redness that he described as tracking up the dorsum of his hand to his wrist, and purulent drainage. He was unable to move his hand due to the pain, so he visited the [facility] again, who instructed that he come to the ED.  He denies any fevers, chills, nausea, vomiting. He denies any trauma to the hand, and denies any recent drug use-adamant that the last time he used was in May. He had an abscess on his right antecubital area two years ago for which he underwent I&D and received IV antibiotics at [facility] that was due to IVDU.  He was last tested for HIV four months ago and was negative. Currently does not want to undergo repeat testing. He is enrolled in a detox/methadone program at [facility] [phone], where he gets 40mg of methadone daily. He was supposed to start treatment for his Hepatitis C 1 year ago, was started on Epclusa; however, he sold his medication for drugs. Now that he is enrolled in a detox program, he plans to resume treatment.     ED Course:   Vitals: /78, HR 84, RR 16 98%, T 98.3  Labs: WBC 7.7, Hg 13.3, INR 1.17, Protein 8.8, AST 47, Lactate 2.2, xray consistent with Severe soft tissue swelling diffusely surrounding the third   digit PIP joint.  Course: IV Vanc 1250mg given, NaCl 1000ml x 1.  Consults: Hand surgery-recommending IV Vanc, I&D done.

## 2021-12-28 NOTE — ED ADULT NURSE REASSESSMENT NOTE - NS ED NURSE REASSESS COMMENT FT1
Pt in no acute distress remains unhappy because wants to go outside and smoke but no allowed due to IV in hand , getachew riley

## 2021-12-28 NOTE — CONSULT NOTE ADULT - PROBLEM SELECTOR RECOMMENDATION 9
drainage of pustule and send for wound culture  IV vancomycin, warm soaks, elevate hand  admit to medicine

## 2021-12-28 NOTE — ED PROVIDER NOTE - CLINICAL SUMMARY MEDICAL DECISION MAKING FREE TEXT BOX
Patient here with left middle finger infection. Will obtain culture and admit as per recommendation of hand surgeon on call.

## 2021-12-28 NOTE — ED ADULT NURSE NOTE - OBJECTIVE STATEMENT
Pt walked in c/o pain to L index finger as per pt was seen at [facility] for same complaint was given medication with no relief , finger appears red swollen and tender to touch with pus like fluid in no acute distress elao rn

## 2021-12-28 NOTE — H&P ADULT - ATTENDING COMMENTS
#L finger cellulitis/abscess: s/p bedside I&D, non septic on admission; hand/surgery following, recommend Vancomycin, will add ceft for additional gram neg coverage, low suspicion for psa. Limited ROM 2/2 pain, prelim hand xray w/o joint effusion, no findings of OM. c/w Vanc/Ceft for now, f/up wound cx, blood cx, hand surgery recs.    #Hx of IVDU: c/w methadone, confirm w/ clinc.

## 2021-12-28 NOTE — H&P ADULT - ASSESSMENT
Patient is a 41 year old male with medical history significant fo hepatitis C, untreated, and IVDU (heroine, last used in May), now on methadone, presenting with a 1 week history of left middle finger swelling, pain, and discharge, admitted for left middle finger cellulitis.

## 2021-12-29 LAB
A1C WITH ESTIMATED AVERAGE GLUCOSE RESULT: 5.5 % — SIGNIFICANT CHANGE UP (ref 4–5.6)
ANION GAP SERPL CALC-SCNC: 7 MMOL/L — SIGNIFICANT CHANGE UP (ref 5–17)
BASOPHILS # BLD AUTO: 0.03 K/UL — SIGNIFICANT CHANGE UP (ref 0–0.2)
BASOPHILS NFR BLD AUTO: 0.5 % — SIGNIFICANT CHANGE UP (ref 0–2)
BUN SERPL-MCNC: 12 MG/DL — SIGNIFICANT CHANGE UP (ref 7–23)
CALCIUM SERPL-MCNC: 9.5 MG/DL — SIGNIFICANT CHANGE UP (ref 8.4–10.5)
CHLORIDE SERPL-SCNC: 106 MMOL/L — SIGNIFICANT CHANGE UP (ref 96–108)
CO2 SERPL-SCNC: 27 MMOL/L — SIGNIFICANT CHANGE UP (ref 22–31)
CREAT SERPL-MCNC: 0.91 MG/DL — SIGNIFICANT CHANGE UP (ref 0.5–1.3)
EOSINOPHIL # BLD AUTO: 0.18 K/UL — SIGNIFICANT CHANGE UP (ref 0–0.5)
EOSINOPHIL NFR BLD AUTO: 3.2 % — SIGNIFICANT CHANGE UP (ref 0–6)
ESTIMATED AVERAGE GLUCOSE: 111 MG/DL — SIGNIFICANT CHANGE UP (ref 68–114)
GLUCOSE SERPL-MCNC: 108 MG/DL — HIGH (ref 70–99)
GRAM STN FLD: SIGNIFICANT CHANGE UP
HCT VFR BLD CALC: 38.1 % — LOW (ref 39–50)
HGB BLD-MCNC: 12.3 G/DL — LOW (ref 13–17)
IMM GRANULOCYTES NFR BLD AUTO: 0.5 % — SIGNIFICANT CHANGE UP (ref 0–1.5)
LYMPHOCYTES # BLD AUTO: 1.56 K/UL — SIGNIFICANT CHANGE UP (ref 1–3.3)
LYMPHOCYTES # BLD AUTO: 27.8 % — SIGNIFICANT CHANGE UP (ref 13–44)
MAGNESIUM SERPL-MCNC: 2 MG/DL — SIGNIFICANT CHANGE UP (ref 1.6–2.6)
MCHC RBC-ENTMCNC: 28.5 PG — SIGNIFICANT CHANGE UP (ref 27–34)
MCHC RBC-ENTMCNC: 32.3 GM/DL — SIGNIFICANT CHANGE UP (ref 32–36)
MCV RBC AUTO: 88.4 FL — SIGNIFICANT CHANGE UP (ref 80–100)
MONOCYTES # BLD AUTO: 0.5 K/UL — SIGNIFICANT CHANGE UP (ref 0–0.9)
MONOCYTES NFR BLD AUTO: 8.9 % — SIGNIFICANT CHANGE UP (ref 2–14)
NEUTROPHILS # BLD AUTO: 3.32 K/UL — SIGNIFICANT CHANGE UP (ref 1.8–7.4)
NEUTROPHILS NFR BLD AUTO: 59.1 % — SIGNIFICANT CHANGE UP (ref 43–77)
NRBC # BLD: 0 /100 WBCS — SIGNIFICANT CHANGE UP (ref 0–0)
PHOSPHATE SERPL-MCNC: 2.7 MG/DL — SIGNIFICANT CHANGE UP (ref 2.5–4.5)
PLATELET # BLD AUTO: 194 K/UL — SIGNIFICANT CHANGE UP (ref 150–400)
POTASSIUM SERPL-MCNC: 4.4 MMOL/L — SIGNIFICANT CHANGE UP (ref 3.5–5.3)
POTASSIUM SERPL-SCNC: 4.4 MMOL/L — SIGNIFICANT CHANGE UP (ref 3.5–5.3)
RBC # BLD: 4.31 M/UL — SIGNIFICANT CHANGE UP (ref 4.2–5.8)
RBC # FLD: 12.6 % — SIGNIFICANT CHANGE UP (ref 10.3–14.5)
SODIUM SERPL-SCNC: 140 MMOL/L — SIGNIFICANT CHANGE UP (ref 135–145)
SPECIMEN SOURCE: SIGNIFICANT CHANGE UP
WBC # BLD: 5.62 K/UL — SIGNIFICANT CHANGE UP (ref 3.8–10.5)
WBC # FLD AUTO: 5.62 K/UL — SIGNIFICANT CHANGE UP (ref 3.8–10.5)

## 2021-12-29 PROCEDURE — 99233 SBSQ HOSP IP/OBS HIGH 50: CPT | Mod: GC

## 2021-12-29 RX ORDER — METHADONE HYDROCHLORIDE 40 MG/1
1 TABLET ORAL
Qty: 0 | Refills: 0 | DISCHARGE

## 2021-12-29 RX ADMIN — Medication 650 MILLIGRAM(S): at 18:40

## 2021-12-29 RX ADMIN — Medication 650 MILLIGRAM(S): at 17:43

## 2021-12-29 RX ADMIN — METHADONE HYDROCHLORIDE 40 MILLIGRAM(S): 40 TABLET ORAL at 09:25

## 2021-12-29 RX ADMIN — Medication 166.67 MILLIGRAM(S): at 01:42

## 2021-12-29 RX ADMIN — Medication 166.67 MILLIGRAM(S): at 13:09

## 2021-12-29 RX ADMIN — CEFTRIAXONE 100 MILLIGRAM(S): 500 INJECTION, POWDER, FOR SOLUTION INTRAMUSCULAR; INTRAVENOUS at 21:13

## 2021-12-29 NOTE — PROGRESS NOTE ADULT - PROBLEM SELECTOR PLAN 1
Review wound C/S from ER and choose oral antibiotic for discharge  warm soaks for left middle finger,  encourage ROM of fingers of left hand to promoted decrease in edema and improve hand function

## 2021-12-29 NOTE — PROGRESS NOTE ADULT - SUBJECTIVE AND OBJECTIVE BOX
OVERNIGHT/INTERVAL EVENTS: luisa  SUBJECTIVE: pt states that he is feeling ok. The pain is better after the procedure from yesterday but still has pain. Denies any fever, chills, cough, SOB, CP, palpitations, abd discomfort, n/v/d, or or skin abrasions.     Allergies: No Known Allergies    Intolerances    MEDICATIONS  (STANDING):  cefTRIAXone   IVPB 1000 milliGRAM(s) IV Intermittent every 24 hours  methadone    Tablet 40 milliGRAM(s) Oral daily  vancomycin  IVPB 1250 milliGRAM(s) IV Intermittent every 12 hours    MEDICATIONS  (PRN):  acetaminophen     Tablet .. 650 milliGRAM(s) Oral every 6 hours PRN Temp greater or equal to 38C (100.4F), Mild Pain (1 - 3)  aluminum hydroxide/magnesium hydroxide/simethicone Suspension 30 milliLiter(s) Oral every 4 hours PRN Dyspepsia  melatonin 3 milliGRAM(s) Oral at bedtime PRN Insomnia  ondansetron Injectable 4 milliGRAM(s) IV Push every 8 hours PRN Nausea and/or Vomiting    OBJECTIVE  Vital Signs Last 24 Hrs  T(C): 36.8 (29 Dec 2021 13:21), Max: 37.1 (28 Dec 2021 16:15)  T(F): 98.2 (29 Dec 2021 13:21), Max: 98.7 (28 Dec 2021 16:15)  HR: 61 (29 Dec 2021 13:21) (61 - 78)  BP: 109/67 (29 Dec 2021 13:21) (100/56 - 138/69)  RR: 18 (29 Dec 2021 13:21) (17 - 19)  SpO2: 97% (29 Dec 2021 13:21) (96% - 98%) RA    Physical Exam  Constitutional: NAD  Head: NC/AT  Eyes: PERRL, EOMI, clear conjunctiva  ENT: no nasal discharge; uvula midline, no oropharyngeal erythema or exudates; MMM  Neck: supple; no JVD or thyromegaly  Respiratory: CTA B/L; no W/R/R, no retractions  Cardiac: +S1/S2; RRR; no M/R/G; PMI non-displaced  Gastrointestinal: soft, NT/ND; no rebound or guarding; +BSx4  Extremities: WWP, no clubbing or cyanosis; no peripheral edema. 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 No streaking noted. Right dorsum with swelling and decreased ROM.   Vascular: 2+ radial, femoral, DP/PT pulses B/L  Neurologic: AAOx3; CNII-XII grossly intact; no focal deficits       OVERNIGHT/INTERVAL EVENTS: luisa  SUBJECTIVE: pt states that he is feeling ok. The pain is better after the procedure from yesterday but still has pain. Denies any fever, chills, cough, SOB, CP, palpitations, abd discomfort, n/v/d, or or skin abrasions.     Allergies: No Known Allergies    Intolerances    MEDICATIONS  (STANDING):  cefTRIAXone   IVPB 1000 milliGRAM(s) IV Intermittent every 24 hours  methadone    Tablet 40 milliGRAM(s) Oral daily  vancomycin  IVPB 1250 milliGRAM(s) IV Intermittent every 12 hours    MEDICATIONS  (PRN):  acetaminophen     Tablet .. 650 milliGRAM(s) Oral every 6 hours PRN Temp greater or equal to 38C (100.4F), Mild Pain (1 - 3)  aluminum hydroxide/magnesium hydroxide/simethicone Suspension 30 milliLiter(s) Oral every 4 hours PRN Dyspepsia  melatonin 3 milliGRAM(s) Oral at bedtime PRN Insomnia  ondansetron Injectable 4 milliGRAM(s) IV Push every 8 hours PRN Nausea and/or Vomiting    OBJECTIVE  Vital Signs Last 24 Hrs  T(C): 36.8 (29 Dec 2021 13:21), Max: 37.1 (28 Dec 2021 16:15)  T(F): 98.2 (29 Dec 2021 13:21), Max: 98.7 (28 Dec 2021 16:15)  HR: 61 (29 Dec 2021 13:21) (61 - 78)  BP: 109/67 (29 Dec 2021 13:21) (100/56 - 138/69)  RR: 18 (29 Dec 2021 13:21) (17 - 19)  SpO2: 97% (29 Dec 2021 13:21) (96% - 98%) RA    Physical Exam  Constitutional: NAD  Head: NC/AT  Eyes: PERRL, EOMI, clear conjunctiva  ENT: no nasal discharge; uvula midline, no oropharyngeal erythema or exudates; MMM  Neck: supple; no JVD or thyromegaly  Respiratory: CTA B/L; no W/R/R, no retractions  Cardiac: +S1/S2; RRR; no M/R/G; PMI non-displaced  Gastrointestinal: soft, NT/ND; no rebound or guarding; +BSx4  Extremities: WWP, no clubbing or cyanosis; no peripheral edema. 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 No streaking noted. Right dorsum with swelling and decreased ROM.   Vascular: 2+ radial, femoral, DP/PT pulses B/L  Neurologic: AAOx3; CNII-XII grossly intact; no focal deficits    Labs                        12.3   5.62  )-----------( 194      ( 29 Dec 2021 11:24 )             38.1   12-29    140  |  106  |  12  ----------------------------<  108<H>  4.4   |  27  |  0.91    Ca    9.5      29 Dec 2021 11:24  Phos  2.7     12-29  Mg     2.0     12-29    TPro  8.8<H>  /  Alb  3.6  /  TBili  0.4  /  DBili  x   /  AST  47<H>  /  ALT  34  /  AlkPhos  70  12-28    A1C with Estimated Average Glucose Result: 5.5  Lactate, Blood: 0.9 mmol/L    RADIOLOGY & ADDITIONAL TESTS REVIEWED: Yes

## 2021-12-29 NOTE — PROGRESS NOTE ADULT - PROBLEM SELECTOR PLAN 3
Diagnosed two years ago, untreated currently. Plans to start Epclusa.  -will continue to monitor. Has history of IVDU-used to use heroine, last used in May 2021. On methadone 40mg daily.     Plan:  - Continue with methadone 40mg daily

## 2021-12-29 NOTE — PROGRESS NOTE ADULT - PROBLEM SELECTOR PLAN 1
Presented with history of left middle finger pain with swelling and discharge since 12/23. XRay demonstrating swelling, consistent with cellulitis, likely 2/2 to possible spider bite. Given purulent drainage, fluctuance on exam, and history of IVDU need to cover for MRSA.   -follow wound cultures  -continue with vancomycin 1250mg BID, check trough before fourth dose.   -Ceftriaxone 1g for gram negative coverage.   -follow hand surgery recommendations. Presented with history of left middle finger pain with swelling and discharge since 12/23. XRay demonstrating swelling, consistent with cellulitis, likely 2/2 to possible spider bite. Given purulent drainage, fluctuance on exam, and history of IVDU need to cover for MRSA.     Plan:  - F/u wound cultures  - Continue with vancomycin 1250mg BID, check trough before fourth dose  - Ceftriaxone 1g for gram negative coverage   - Follow hand surgery recommendations - warm soaks x2 per day

## 2021-12-29 NOTE — PROGRESS NOTE ADULT - SUBJECTIVE AND OBJECTIVE BOX
WBC down to 5K, erythema much less, tenderness to palpation of dorsum of left middle finger, middle phalanx  Wound C/S pending  increased ROM of left middle finger, decreased swelling

## 2021-12-29 NOTE — PROGRESS NOTE ADULT - PROBLEM SELECTOR PLAN 4
Has history of IVDU-used to use heroine, last used in may. On methadone 40mg daily.   -continue with methadone 40mg daily.  -call clinic in AM to confirm dosage. F-none  E-replete as needed  N-regular diet  DVT: SCD

## 2021-12-29 NOTE — PROGRESS NOTE ADULT - PROBLEM SELECTOR PLAN 2
Presented with elevated lactate of 2.1 likely secondary to infection.  -will trend to clear. Diagnosed two years ago, untreated currently. Plans to start Epclusa.    Plan:  - Will continue to monitor  - Set up f/u w/ PCP upon discharge

## 2021-12-30 VITALS
SYSTOLIC BLOOD PRESSURE: 112 MMHG | RESPIRATION RATE: 18 BRPM | DIASTOLIC BLOOD PRESSURE: 78 MMHG | OXYGEN SATURATION: 98 % | TEMPERATURE: 98 F | HEART RATE: 67 BPM

## 2021-12-30 LAB — VANCOMYCIN TROUGH SERPL-MCNC: 14.4 UG/ML — SIGNIFICANT CHANGE UP (ref 10–20)

## 2021-12-30 PROCEDURE — 99285 EMERGENCY DEPT VISIT HI MDM: CPT | Mod: 25

## 2021-12-30 PROCEDURE — 83605 ASSAY OF LACTIC ACID: CPT

## 2021-12-30 PROCEDURE — 87205 SMEAR GRAM STAIN: CPT

## 2021-12-30 PROCEDURE — 85610 PROTHROMBIN TIME: CPT

## 2021-12-30 PROCEDURE — 80048 BASIC METABOLIC PNL TOTAL CA: CPT

## 2021-12-30 PROCEDURE — 80053 COMPREHEN METABOLIC PANEL: CPT

## 2021-12-30 PROCEDURE — 83735 ASSAY OF MAGNESIUM: CPT

## 2021-12-30 PROCEDURE — 85027 COMPLETE CBC AUTOMATED: CPT

## 2021-12-30 PROCEDURE — 87186 SC STD MICRODIL/AGAR DIL: CPT

## 2021-12-30 PROCEDURE — 87635 SARS-COV-2 COVID-19 AMP PRB: CPT

## 2021-12-30 PROCEDURE — 83036 HEMOGLOBIN GLYCOSYLATED A1C: CPT

## 2021-12-30 PROCEDURE — 96374 THER/PROPH/DIAG INJ IV PUSH: CPT

## 2021-12-30 PROCEDURE — 73140 X-RAY EXAM OF FINGER(S): CPT

## 2021-12-30 PROCEDURE — 84100 ASSAY OF PHOSPHORUS: CPT

## 2021-12-30 PROCEDURE — 85025 COMPLETE CBC W/AUTO DIFF WBC: CPT

## 2021-12-30 PROCEDURE — 36415 COLL VENOUS BLD VENIPUNCTURE: CPT

## 2021-12-30 PROCEDURE — 10060 I&D ABSCESS SIMPLE/SINGLE: CPT

## 2021-12-30 PROCEDURE — 80202 ASSAY OF VANCOMYCIN: CPT

## 2021-12-30 PROCEDURE — 85730 THROMBOPLASTIN TIME PARTIAL: CPT

## 2021-12-30 PROCEDURE — 87070 CULTURE OTHR SPECIMN AEROBIC: CPT

## 2021-12-30 RX ORDER — BACITRACIN ZINC 500 UNIT/G
1 OINTMENT IN PACKET (EA) TOPICAL
Qty: 14 | Refills: 0
Start: 2021-12-30 | End: 2022-01-05

## 2021-12-30 RX ORDER — VANCOMYCIN HCL 1 G
1500 VIAL (EA) INTRAVENOUS EVERY 12 HOURS
Refills: 0 | Status: DISCONTINUED | OUTPATIENT
Start: 2021-12-30 | End: 2021-12-30

## 2021-12-30 RX ADMIN — METHADONE HYDROCHLORIDE 40 MILLIGRAM(S): 40 TABLET ORAL at 09:02

## 2021-12-30 RX ADMIN — Medication 300 MILLIGRAM(S): at 02:25

## 2021-12-30 RX ADMIN — Medication 1 TABLET(S): at 15:11

## 2021-12-30 RX ADMIN — Medication 3 MILLIGRAM(S): at 01:37

## 2021-12-30 NOTE — DISCHARGE NOTE PROVIDER - CARE PROVIDER_API CALL
Vannessa Head)  Internal Medicine  [address] [address]  Phone: [phone]  Fax: [phone]  Follow Up Time: 1 week

## 2021-12-30 NOTE — DISCHARGE NOTE PROVIDER - NSDCCPCAREPLAN_GEN_ALL_CORE_FT
PRINCIPAL DISCHARGE DIAGNOSIS  Diagnosis: Cellulitis of middle finger, left  Assessment and Plan of Treatment: An abcess formed on your left middle finger for which clindamycin did not work. Our plastic surgeon came to drain it to help relieve the pain. Please continue to change the dressings to this area twice a day and place over the counter bacitracin ointment to help with the healing. We are also giving you antibiotics for 5 days (Bactrim) for which you will take twice a day. Please visit with a primary care physician within 1 week to make sure that the wound is healing properly.      SECONDARY DISCHARGE DIAGNOSES  Diagnosis: Hepatitis C  Assessment and Plan of Treatment: You have a history of Hep C for which has been untreated for the past 2 years or more. This disease can be treated with medications prescribed by your primary care physician or a specialist. Please follow-up with your primary care provider to help you schedule for Hep C treatment.

## 2021-12-30 NOTE — DISCHARGE NOTE PROVIDER - NSDCMRMEDTOKEN_GEN_ALL_CORE_FT
methadone 40 mg oral tablet: 1 tab(s) orally once a day   methadone 40 mg oral tablet: 1 tab(s) orally once a day  sulfamethoxazole-trimethoprim 800 mg-160 mg oral tablet: 1 tab(s) orally 2 times a day   Baciguent 500 units/g topical ointment: Apply topically to affected area 2 times a day   methadone 40 mg oral tablet: 1 tab(s) orally once a day  sulfamethoxazole-trimethoprim 800 mg-160 mg oral tablet: 1 tab(s) orally 2 times a day

## 2021-12-30 NOTE — PROGRESS NOTE ADULT - SUBJECTIVE AND OBJECTIVE BOX
SUBJECTIVE:  Doing well.   No overnight events.     OBJECTIVE:     ** VITAL SIGNS / I&O's **    Vital Signs Last 24 Hrs  T(C): 36.7 (30 Dec 2021 13:37), Max: 36.9 (30 Dec 2021 05:04)  T(F): 98 (30 Dec 2021 13:37), Max: 98.5 (30 Dec 2021 05:04)  HR: 67 (30 Dec 2021 13:37) (53 - 67)  BP: 112/78 (30 Dec 2021 13:37) (107/71 - 121/75)  BP(mean): --  RR: 18 (30 Dec 2021 13:37) (17 - 18)  SpO2: 98% (30 Dec 2021 13:37) (96% - 99%)      29 Dec 2021 07:01  -  30 Dec 2021 07:00  --------------------------------------------------------  IN:    IV PiggyBack: 50 mL    IV PiggyBack: 500 mL    Oral Fluid: 2040 mL  Total IN: 2590 mL    OUT:    Voided (mL): 1800 mL  Total OUT: 1800 mL    Total NET: 790 mL      30 Dec 2021 07:01  -  30 Dec 2021 14:39  --------------------------------------------------------  IN:    Oral Fluid: 610 mL  Total IN: 610 mL    OUT:    Voided (mL): 1170 mL  Total OUT: 1170 mL    Total NET: -560 mL          ** PHYSICAL EXAM **    -- CONSTITUTIONAL: Alert, Awake. NAD.   -- RESPIRATORY: unlabored breathing, no respiratory distress  -- L hand: L middle finger improving erythema, no active drainage from wound at dorsum of finger, no collections      ** LABS **                          12.3   5.62  )-----------( 194      ( 29 Dec 2021 11:24 )             38.1     29 Dec 2021 11:24    140    |  106    |  12     ----------------------------<  108    4.4     |  27     |  0.91     Ca    9.5        29 Dec 2021 11:24  Phos  2.7       29 Dec 2021 11:24  Mg     2.0       29 Dec 2021 11:24        CAPILLARY BLOOD GLUCOSE

## 2021-12-30 NOTE — DISCHARGE NOTE PROVIDER - HOSPITAL COURSE
see below #Discharge: do not delete    Mr. Galvez is a 41 year old male with PMHx of Hepatitis C, untreated, and IVDU (heroine, last used in May, now on methadone), presenting with a history of left middle finger swelling, pain, and discharge since Thursday morning (12/23). Notes that on Thursday morning he woke up with a small red lesion on the dorsum of his left middle finger that he describes as very painful and very pruritis; he denied any cuts, injections, etc. to cause the site to become painful/red. He went to [facility], where he was told that the lesion appeared to be a spider bite, and was instructed to take a course of clindamycin and take motrin. He never picked up the Clinda, and woke up on Tuesday (12/28) AM with increased swelling, redness that he described as tracking up the dorsum of his hand to his wrist, and purulent drainage. He was unable to move his hand due to the pain, so he visited the [facility] again, who instructed that he come to the ED.  He had an abscess on his right antecubital area two years ago for which he underwent I&D and received IV antibiotics at [facility] that was due to IVDU. During his admission, plastic surgery saw him and performed I&D and purulence drained from the site. He continued to have pain for which there was a PRN order of tylenol, warm soaks for the finger per PSY recommendations, and switched to PO ABX on 12/30 to bactrim. He was hemodynamically stable for discharge on 12/30/21.    Problem List/Main Diagnoses (system-based):  Problem: Cellulitis of middle finger, left.   Presented with history of left middle finger pain with swelling and discharge since 12/23. XRay demonstrating swelling, consistent with cellulitis, likely 2/2 to possible spider bite. Given purulent drainage, fluctuance on exam, and history of IVDU covered for MRSA with vancomycin.     Plan:  - Warm soaks x2 per day  - Rx bactrim 160 BID for 5 days     Problem: Hepatitis C.   Diagnosed two years ago, untreated currently. Plans to start Epclusa.    Plan:  - Will continue to monitor  - Set up f/u w/ PCP upon discharge.    Problem/Plan - 3   ·  Problem: IVDU (intravenous drug user).   ·  Plan: Has history of IVDU-used to use heroine, last used in May 2021. On methadone 40mg daily.     Plan:  - Continue with methadone 40mg daily.       Patient was discharged to: (home/[facility]/acute rehab/hospice, etc, and with what services – home health PT/RN? Home O2?)  New medications:  Changes to old medications:  Medications that were stopped:   Items to follow up as outpatient:  Physical exam at the time of discharge: #Discharge: do not delete    Mr. Galvez is a 41 year old male with PMHx of Hepatitis C, untreated, and IVDU (heroine, last used in May, now on methadone), presenting with a history of left middle finger swelling, pain, and discharge since Thursday morning (12/23). Notes that on Thursday morning he woke up with a small red lesion on the dorsum of his left middle finger that he describes as very painful and very pruritis; he denied any cuts, injections, etc. to cause the site to become painful/red. He went to [facility], where he was told that the lesion appeared to be a spider bite, and was instructed to take a course of clindamycin and take motrin. He never picked up the Clinda, and woke up on Tuesday (12/28) AM with increased swelling, redness that he described as tracking up the dorsum of his hand to his wrist, and purulent drainage. He was unable to move his hand due to the pain, so he visited the [facility] again, who instructed that he come to the ED.  He had an abscess on his right antecubital area two years ago for which he underwent I&D and received IV antibiotics at [facility] that was due to IVDU. During his admission, plastic surgery saw him and performed I&D and purulence drained from the site. He continued to have pain for which there was a PRN order of tylenol, warm soaks for the finger per PSY recommendations, and switched to PO ABX on 12/30 to bactrim. He was hemodynamically stable for discharge on 12/30/21.    Problem List/Main Diagnoses (system-based):  Problem: Cellulitis of middle finger, left.   Presented with history of left middle finger pain with swelling and discharge since 12/23. XRay demonstrating swelling, consistent with cellulitis, likely 2/2 to possible spider bite. Given purulent drainage, fluctuance on exam, and history of IVDU covered for MRSA with vancomycin.     Plan:  - Warm soaks x2 per day  - Rx bactrim 160 BID for 5 days     Problem: Hepatitis C.   Diagnosed two years ago, untreated currently. Plans to start Epclusa.    Plan:  - Will continue to monitor  - Set up f/u w/ PCP upon discharge    Problem: IVDU (intravenous drug user).   Has history of IVDU-used to use heroine, last used in May 2021. On methadone 40mg daily.     Plan:  - Continue with methadone 40mg daily.       Patient was discharged to: home  New medications: bactrim   Changes to old medications: none  Medications that were stopped: none  Items to follow up as outpatient: Hep C treatment; wound healing     Physical exam at the time of discharge:  Vital Signs Last 24 Hrs  T(C): 36.7 (30 Dec 2021 13:37), Max: 36.9 (30 Dec 2021 05:04)  T(F): 98 (30 Dec 2021 13:37), Max: 98.5 (30 Dec 2021 05:04)  HR: 67 (30 Dec 2021 13:37) (53 - 67)  BP: 112/78 (30 Dec 2021 13:37) (107/71 - 121/75)  RR: 18 (30 Dec 2021 13:37) (17 - 18)  SpO2: 98% (30 Dec 2021 13:37) (96% - 99%) on RA     Constitutional: NAD  Head: NC/AT  Eyes: PERRL, EOMI, clear conjunctiva  ENT: no nasal discharge; uvula midline, no oropharyngeal erythema or exudates; MMM  Neck: supple; no JVD or thyromegaly  Respiratory: CTA B/L; no W/R/R, no retractions  Cardiac: +S1/S2; RRR; no M/R/G; PMI non-displaced  Gastrointestinal: soft, NT/ND; no rebound or guarding; +BSx4  Extremities: WWP, no clubbing or cyanosis; no peripheral edema. 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 No streaking noted. Right dorsum with swelling and decreased ROM.   Vascular: 2+ radial, femoral, DP/PT pulses B/L  Neurologic: AAOx3; CNII-XII grossly intact; no focal deficits

## 2021-12-30 NOTE — PROGRESS NOTE ADULT - SUBJECTIVE AND OBJECTIVE BOX
Patient was seen and examined by me at bedside. I agree with resident's note, subjective, objective physical exam, assessment and plan with following modifications/additions.    Greater than 35 minutes spent on total encounter; more than 50% of the visit was spent counseling and/or coordinating care by the attending physician.    Clinically stable, wound looks largely intact from yest but pt reports some more purulent drainage overnight and mild swelling noted at site likely 2/2 warm soaks and serrosang drainage but will have plastics team review again and if no concern for collection dc on po bactrim with staph on wound cx. Will provide f/u info with plastics attending    Dc Anderson MD [phone]

## 2021-12-30 NOTE — DISCHARGE NOTE NURSING/CASE MANAGEMENT/SOCIAL WORK - PATIENT PORTAL LINK FT
You can access the FollowMyHealth Patient Portal offered by [facility] by registering at the following website: [email or web address]. By joining [employer]’s FollowMyHealth portal, you will also be able to view your health information using other applications (apps) compatible with our system.

## 2021-12-30 NOTE — DISCHARGE NOTE NURSING/CASE MANAGEMENT/SOCIAL WORK - NSDCPEFALRISK_GEN_ALL_CORE
For information on Fall & Injury Prevention, visit: [email or web address] OR  [email or web address] OR  https://www.cdc.gov/steadi/patient.html

## 2021-12-30 NOTE — PROGRESS NOTE ADULT - ASSESSMENT
This is a 41 year old male with PMHx of hepatitis C, untreated, and IVDU (heroine, last used in May), now on methadone who presents with a 1 week history of left middle finger swelling, pain, and discharge, admitted for left middle finger cellulitis 2/2 debridement. 
40 y/o male with L middle finger cellulitis s/p I&D  - PO antibiotics for discharge  - continue warm water soaks  - follow up with primary care  - no need for surgical intervention  - discussed with Dr. Gaffney
resolving cellulitis of left middle finger

## 2021-12-31 LAB
-  AMPICILLIN/SULBACTAM: SIGNIFICANT CHANGE UP
-  CEFAZOLIN: SIGNIFICANT CHANGE UP
-  CLINDAMYCIN: SIGNIFICANT CHANGE UP
-  DAPTOMYCIN: SIGNIFICANT CHANGE UP
-  ERYTHROMYCIN: SIGNIFICANT CHANGE UP
-  GENTAMICIN: SIGNIFICANT CHANGE UP
-  LINEZOLID: SIGNIFICANT CHANGE UP
-  OXACILLIN: SIGNIFICANT CHANGE UP
-  PENICILLIN: SIGNIFICANT CHANGE UP
-  RIFAMPIN: SIGNIFICANT CHANGE UP
-  TETRACYCLINE: SIGNIFICANT CHANGE UP
-  TRIMETHOPRIM/SULFAMETHOXAZOLE: SIGNIFICANT CHANGE UP
-  VANCOMYCIN: SIGNIFICANT CHANGE UP
CULTURE RESULTS: SIGNIFICANT CHANGE UP
METHOD TYPE: SIGNIFICANT CHANGE UP
ORGANISM # SPEC MICROSCOPIC CNT: SIGNIFICANT CHANGE UP
ORGANISM # SPEC MICROSCOPIC CNT: SIGNIFICANT CHANGE UP
SPECIMEN SOURCE: SIGNIFICANT CHANGE UP

## 2022-01-06 DIAGNOSIS — L02.512 CUTANEOUS ABSCESS OF LEFT HAND: ICD-10-CM

## 2022-01-06 DIAGNOSIS — T63.301A TOXIC EFFECT OF UNSPECIFIED SPIDER VENOM, ACCIDENTAL (UNINTENTIONAL), INITIAL ENCOUNTER: ICD-10-CM

## 2022-01-06 DIAGNOSIS — F11.20 OPIOID DEPENDENCE, UNCOMPLICATED: ICD-10-CM

## 2022-01-06 DIAGNOSIS — L03.012 CELLULITIS OF LEFT FINGER: ICD-10-CM

## 2022-01-06 DIAGNOSIS — R74.02 ELEVATION OF LEVELS OF LACTIC ACID DEHYDROGENASE [LDH]: ICD-10-CM

## 2022-01-06 DIAGNOSIS — B95.8 UNSPECIFIED STAPHYLOCOCCUS AS THE CAUSE OF DISEASES CLASSIFIED ELSEWHERE: ICD-10-CM

## 2022-01-06 DIAGNOSIS — B19.20 UNSPECIFIED VIRAL HEPATITIS C WITHOUT HEPATIC COMA: ICD-10-CM
